# Patient Record
Sex: MALE | Race: WHITE | NOT HISPANIC OR LATINO
[De-identification: names, ages, dates, MRNs, and addresses within clinical notes are randomized per-mention and may not be internally consistent; named-entity substitution may affect disease eponyms.]

---

## 2019-08-13 ENCOUNTER — APPOINTMENT (OUTPATIENT)
Dept: ORTHOPEDIC SURGERY | Facility: CLINIC | Age: 59
End: 2019-08-13
Payer: MEDICAID

## 2019-08-13 VITALS
WEIGHT: 155 LBS | HEART RATE: 76 BPM | DIASTOLIC BLOOD PRESSURE: 78 MMHG | BODY MASS INDEX: 24.91 KG/M2 | SYSTOLIC BLOOD PRESSURE: 140 MMHG | HEIGHT: 66 IN

## 2019-08-13 DIAGNOSIS — S22.000A WEDGE COMPRESSION FRACTURE OF UNSPECIFIED THORACIC VERTEBRA, INITIAL ENCOUNTER FOR CLOSED FRACTURE: ICD-10-CM

## 2019-08-13 DIAGNOSIS — S32.010A WEDGE COMPRESSION FRACTURE OF FIRST LUMBAR VERTEBRA, INITIAL ENCOUNTER FOR CLOSED FRACTURE: ICD-10-CM

## 2019-08-13 PROCEDURE — 96372 THER/PROPH/DIAG INJ SC/IM: CPT | Mod: 59

## 2019-08-13 PROCEDURE — 99204 OFFICE O/P NEW MOD 45 MIN: CPT | Mod: 25

## 2019-08-14 PROBLEM — S32.010A CLOSED COMPRESSION FRACTURE OF FIRST LUMBAR VERTEBRA, INITIAL ENCOUNTER: Status: ACTIVE | Noted: 2019-08-14

## 2019-08-14 PROBLEM — S22.000A CLOSED COMPRESSION FRACTURE OF THORACIC VERTEBRA, INITIAL ENCOUNTER: Status: ACTIVE | Noted: 2019-08-14

## 2019-08-14 NOTE — PROCEDURE
[de-identified] : ketorolac injection 30 mg , Depo-Medrol 40 was given intramuscularly in the right buttock after alcohol swab x3. 18-gauge needle was used to draw out the medication, new needle was changed a 22-gauge afterward. Patient tolerated well without any adverse effects.

## 2019-08-14 NOTE — DISCUSSION/SUMMARY
[de-identified] : Due to noticeable left thigh atrophy, Radiculitis in an L2, L3 distribution probable HNP, need lumbar MRI for treatment plan, injection therapy vs decompression.  mRI is also medically necessary to determine the age of these compression fractures at L1 and T11. I would not recommend bracing as patient does not have any point tenderness over the L1/T11 areas. He did well after a therapeutic injection of ketorolac and Depo-Medrol and will follow with oral anterolateral 5 days and then Medrol Dosepak for 6 days. Physical therapy for decrease pain and increase function. He will followup after MRI is completed for further recommendations including possible injection therapy versus decompression.

## 2019-08-14 NOTE — PHYSICAL EXAM
[de-identified] : Lumbar exam:\par CONSTITUTIONAL: The patient is a very pleasant individual who is well-nourished and who appears stated age.\par PSYCHIATRIC: The patient is alert and oriented X 3 and in no apparent distress, and participates with orthopedic evaluation well.\par HEAD: Atraumatic and is nonsyndromic in appearance.\par EENT: No visible thyromegaly, EOMI.\par RESPIRATORY: Respiratory rate is regular, not dyspneic on examination.\par LYMPHATICS: There is no inguinal lymphadenopathy\par INTEGUMENTARY: Skin is clean, dry, and intact about the bilateral lower extremities and lumbar spine.\par VASCULAR: There is brisk capillary refill about the bilateral lower extremities.\par NEUROLOGIC: There are no pathologic reflexes. There is a free distribution decrease in sensation of the left lower extremity on Wartenberg pinwheel examination. Deep tendon reflexes are well maintained at 2+/4 of the bilateral lower extremities and are symmetric..\par MUSCULOSKELETAL: There is  visible muscular atrophy of the left thigh/quadricep. Manual motor strength is well maintained in the right lower extremity, L2/L3 distribution 4/5 on the left. Range of motion of lumbar spine is well maintained with some discomfort on flextion greater than 30 degrees across lumbar spine, there is piriformis sprain and strain/gluteus tendinopathy type findings right. The patient ambulates in a non-myelopathic manner. Negative tension sign and straight leg raise bilaterally.  ankle dorsiflexion, EHL, plantar flexion, and ankle eversion are well preserved. Normal secondary orthopaedic exam of bilateral hips, greater trochanteric area, knees and ankles\par  [de-identified] : Lumbar x-ray done at [facility] radiology on August 20 19th shows age indeterminate minor superior and plate compression fractures of L1 and T11.  Grade 1 spondylolisthesis at L4-L5 as well as L3-L4 and L5-S1. Facet arthropathy is significant at L5-S1 bilaterally.

## 2019-08-14 NOTE — HISTORY OF PRESENT ILLNESS
[de-identified] : Right-sided low back pain, across back, associated left-sided numbness and weakness in his buttock to his anterior thigh has been going on approximately 6 weeks after swimming in a pole. He felt a popping sensation in his upper back and after that had these symptoms. Pain is worsened with standing and better with lying down. 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 He takes Tylenol and ibuprofen for pain which sometimes is helpful.. He consulted his primary care provider who gave him anti-inflammatories muscle relaxants. Primary care provider did order a lumbar x-ray which did show age indeterminate compression fracture of L1 and T11.

## 2019-08-16 ENCOUNTER — APPOINTMENT (OUTPATIENT)
Dept: MRI IMAGING | Facility: CLINIC | Age: 59
End: 2019-08-16
Payer: MEDICAID

## 2019-08-16 ENCOUNTER — OUTPATIENT (OUTPATIENT)
Dept: OUTPATIENT SERVICES | Facility: HOSPITAL | Age: 59
LOS: 1 days | End: 2019-08-16
Payer: MEDICAID

## 2019-08-16 DIAGNOSIS — M47.816 SPONDYLOSIS WITHOUT MYELOPATHY OR RADICULOPATHY, LUMBAR REGION: ICD-10-CM

## 2019-08-16 DIAGNOSIS — M62.50 MUSCLE WASTING AND ATROPHY, NOT ELSEWHERE CLASSIFIED, UNSPECIFIED SITE: ICD-10-CM

## 2019-08-16 PROCEDURE — 72148 MRI LUMBAR SPINE W/O DYE: CPT | Mod: 26

## 2019-08-16 PROCEDURE — 72148 MRI LUMBAR SPINE W/O DYE: CPT

## 2019-08-28 ENCOUNTER — APPOINTMENT (OUTPATIENT)
Dept: ORTHOPEDIC SURGERY | Facility: CLINIC | Age: 59
End: 2019-08-28
Payer: MEDICAID

## 2019-08-28 VITALS
HEART RATE: 59 BPM | SYSTOLIC BLOOD PRESSURE: 152 MMHG | DIASTOLIC BLOOD PRESSURE: 83 MMHG | HEIGHT: 66 IN | BODY MASS INDEX: 24.91 KG/M2 | WEIGHT: 155 LBS

## 2019-08-28 PROCEDURE — 99215 OFFICE O/P EST HI 40 MIN: CPT

## 2019-08-28 NOTE — DISCUSSION/SUMMARY
[Medication Risks Reviewed] : Medication risks reviewed [Surgical risks reviewed] : Surgical risks reviewed [de-identified] : A long discussion was had with the patient regarding surgical plan L3-L4 discectomy on the left, hemilaminotomy. Patient is aware that surgery is elective in nature and is choosing to proceed with surgery. Risks including persistent numbness, weakness of the left lower extremity, infection, recurrent disc herniation, benefits including decrease in pain scale, return of lower extremity motor strength, alternatives were discussed and all questions, comments and concerns were answered to the patient's satisfaction. The statistical probability of improvement was discussed at length as well as post surgical course.  Literature was provided regarding the specific type of surgery as well as a written surgical consent which the patient will need to sign and return to the office prior to surgical date.  We also discussed his history of low back pain, he does a spondylolisthesis and eventually could require a L4-L5 lumbar fusion and laminectomy however at this time he is not suffering neurogenic claudication type symptoms, back pain is controlled, and he has not exhausted conservative management regarding this issue. His main complaint distally weakness of the left lower extremity caused by this extruded disc herniation at L3-L4.\par If patient is a smoker, discontinuation of smoking was advised and must be accomplished 6-8 weeks prior to surgery date.  Patient was advised that help with quitting smoking is available through [facility] Smoker's Quit Line and phone number/website was provided, or patient can ask assistance from primary care provider.  Elective surgery will not be performed unless patient complies with this policy. \par \par He would like to try one or 2 pain management injections prior to embarking upon spine surgery which I think is somewhat reasonable although due to the size of the disc as well as motor weakness on the left lower extremity I am encouraging him to consider surgery. He was given literature to peruse and was encouraged to the call with any questions comments or concerns. I will call him next week after one pain management injection, and if pain and weakness persists consider scheduling discectomy and hemilaminotomy surgery as above.  He is aware that leaving disc herniation at this without decompression after 12 weeks duration may result in permanent numbness, weakness of the left lower extremity.\par \par best contact number [phone]

## 2019-08-28 NOTE — HISTORY OF PRESENT ILLNESS
[de-identified] : Right-sided low back pain, across back, associated left-sided numbness and weakness in his buttock to his anterior thigh has been going on approximately 8 weeks after swimming in a pole. He felt a popping sensation in his upper back and after that had these symptoms. Pain is worsened with standing and better with lying down. 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 He takes Tylenol and ibuprofen for pain which sometimes is helpful.. He consulted his primary care provider who gave him anti-inflammatories muscle relaxants. Primary care provider did order a lumbar x-ray which did show age indeterminate compression fracture of L1 and T11.

## 2019-08-28 NOTE — PHYSICAL EXAM
[de-identified] : Lumbar exam:\par CONSTITUTIONAL: The patient is a very pleasant individual who is well-nourished and who appears stated age.\par PSYCHIATRIC: The patient is alert and oriented X 3 and in no apparent distress, and participates with orthopedic evaluation well.\par HEAD: Atraumatic and is nonsyndromic in appearance.\par EENT: No visible thyromegaly, EOMI.\par RESPIRATORY: Respiratory rate is regular, not dyspneic on examination.\par LYMPHATICS: There is no inguinal lymphadenopathy\par INTEGUMENTARY: Skin is clean, dry, and intact about the bilateral lower extremities and lumbar spine.\par VASCULAR: There is brisk capillary refill about the bilateral lower extremities.\par NEUROLOGIC: There are no pathologic reflexes. There is a free distribution decrease in sensation of the left lower extremity on Wartenberg pinwheel examination. Deep tendon reflexes are well maintained at 2+/4 of the bilateral lower extremities and are symmetric..\par MUSCULOSKELETAL: There is visible muscular atrophy of the left thigh/quadricep. Manual motor strength is well maintained in the right lower extremity, L2/L3 distribution 4/5 on the left. Range of motion of lumbar spine is well maintained with some discomfort on flextion greater than 30 degrees across lumbar spine, there is piriformis sprain and strain/gluteus tendinopathy type findings right. The patient ambulates in a non-myelopathic manner. Negative tension sign and straight leg raise bilaterally. ankle dorsiflexion, EHL, plantar flexion, and ankle eversion are well preserved. Normal secondary orthopaedic exam of bilateral hips, greater trochanteric area, knees and ankles\par  [de-identified] : Lumbar MRI done in August of 2019 [facility] imaging demonstrates L3-L4 disc herniation paracentral to the left causing moderate central canal stenosis. L4-L5 severe facet arthropathy with moderate to severe bilateral foraminal narrowing and moderate central canal stenosis.STIR sequence does not reveal any acute compression fracture

## 2020-06-11 ENCOUNTER — APPOINTMENT (OUTPATIENT)
Dept: ORTHOPEDIC SURGERY | Facility: CLINIC | Age: 60
End: 2020-06-11
Payer: MEDICAID

## 2020-06-11 VITALS
DIASTOLIC BLOOD PRESSURE: 75 MMHG | BODY MASS INDEX: 25.07 KG/M2 | WEIGHT: 156 LBS | SYSTOLIC BLOOD PRESSURE: 118 MMHG | HEIGHT: 66 IN | HEART RATE: 67 BPM

## 2020-06-11 VITALS — TEMPERATURE: 90.3 F

## 2020-06-11 DIAGNOSIS — M47.816 SPONDYLOSIS W/OUT MYELOPATHY OR RADICULOPATHY, LUMBAR REGION: ICD-10-CM

## 2020-06-11 PROCEDURE — 72100 X-RAY EXAM L-S SPINE 2/3 VWS: CPT

## 2020-06-11 PROCEDURE — 96372 THER/PROPH/DIAG INJ SC/IM: CPT | Mod: 59

## 2020-06-11 PROCEDURE — 99214 OFFICE O/P EST MOD 30 MIN: CPT | Mod: 25

## 2020-06-11 RX ORDER — METHYLPREDNISOLONE 4 MG/1
4 TABLET ORAL
Qty: 1 | Refills: 0 | Status: ACTIVE | COMMUNITY
Start: 2020-06-11 | End: 1900-01-01

## 2020-06-11 RX ORDER — KETOROLAC TROMETHAMINE 10 MG/1
10 TABLET, FILM COATED ORAL EVERY 6 HOURS
Qty: 20 | Refills: 0 | Status: ACTIVE | COMMUNITY
Start: 2020-06-11 | End: 1900-01-01

## 2020-06-11 NOTE — PROCEDURE
[de-identified] : Verbal consent was obtained and all questions, comments and concerns were addressed. Right buttock was cleansed with alcohol prep X 3, ethyl chloride was used as local anesthetic. Under sterile precautions 30mg of Ketorolac and 80mg Depo-Medrol were instilled in to the right buttock under sterile precautions. he tolerated procedure well. Dry sterile dressing was placed and patient described relief of pain 5 minutes after procedure was performed.

## 2020-06-11 NOTE — DISCUSSION/SUMMARY
[de-identified] : Pt tolerated the injection given in office today. I will prescribe a Medrol Dose Pack to provide pain relief to be followed by oral Toradol. An MRI has been ordered and is medically necessary due to persistent pain, chronic disc herniation, and failed PT since March. MRI will help guide treatment plan, possible surgical intervention vs injection therapy with pain management. The patient will follow up after the MRI results have been obtained / 4 weeks.

## 2020-06-11 NOTE — PHYSICAL EXAM
[Poor Appearance] : well-appearing [Obese] : not obese [Acute Distress] : not in acute distress [de-identified] : CONSTITUTIONAL: The patient is a very pleasant individual who is well-nourished and who appears stated age.\par PSYCHIATRIC: The patient is alert and oriented X 3 and in no apparent distress, and participates with orthopedic evaluation well.\par HEAD: Atraumatic and is nonsyndromic in appearance.\par EENT: No visible thyromegaly, EOMI.\par RESPIRATORY: Respiratory rate is regular, not dyspneic on examination.\par LYMPHATICS: There is no inguinal lymphadenopathy\par INTEGUMENTARY: Skin is clean, dry, and intact about the bilateral lower extremities and lumbar spine.\par VASCULAR: There is brisk capillary refill about the bilateral lower extremities.\par NEUROLOGIC: There are no pathologic reflexes. Deep tendon reflexes are well maintained at 2+/4 of the bilateral lower extremities and are symmetric.\par MUSCULOSKELETAL: There is no visible muscular atrophy. Manual motor strength is well maintained in the bilateral lower extremities. Range of motion of lumbar spine is well maintained. The patient ambulates in a non-myelopathic manner. Positive tension sign on the right. Quad extension, ankle dorsiflexion, EHL, plantar flexion, and ankle eversion are well preserved. Normal secondary orthopaedic exam of bilateral hips, greater trochanteric area, knees and ankles \par \par Mechanically orientated low back pain with extension of lumbar spine. chronic unchanged L3 paraesthesia.  [de-identified] : X-ray of the lumbar spine taken today shows advanced lumbar DDD from L3-L5. Retrolisthesis at L3 in relation to L4. Grade 1 spondylolisthesis L4-L5. Advanced end stage spondylosis L5-S1. Pt also has severe DDD in thoracolumbar spine a well what appears to be kyphosis.

## 2020-06-11 NOTE — ADDENDUM
[FreeTextEntry1] : Documented by Guevara Calderon acting as a scribe for ANAIS Navarro on 06/11/2020\par All medical record entries made by the Scribe were at my, ANAIS Navarro, direction and personally dictated by me on 06/11/2020 . I have reviewed the chart and agree that the record accurately reflects my personal performance of the history, physical exam, assessment and plan. I have also personally directed, reviewed, and agreed with the chart.

## 2020-06-11 NOTE — HISTORY OF PRESENT ILLNESS
[Ataxia] : no ataxia [de-identified] : 59 year old M presents with lumbar spine pain. He has had back pain on and off for years, but last 3 months he has had this consistent acute ride sided low back pain. He has been enrolled in PT, ordered by PCP since March 2020 with no relief. He also has persistent numbness of left anterior thigh which is related to chronic disc herniation. Worse with rest, better with activity. [Incontinence] : no incontinence [Loss of Dexterity] : good dexterity [Urinary Ret.] : no urinary retention

## 2020-07-08 ENCOUNTER — OUTPATIENT (OUTPATIENT)
Dept: OUTPATIENT SERVICES | Facility: HOSPITAL | Age: 60
LOS: 1 days | End: 2020-07-08
Payer: MEDICAID

## 2020-07-08 ENCOUNTER — APPOINTMENT (OUTPATIENT)
Dept: MRI IMAGING | Facility: CLINIC | Age: 60
End: 2020-07-08
Payer: MEDICAID

## 2020-07-08 DIAGNOSIS — M43.16 SPONDYLOLISTHESIS, LUMBAR REGION: ICD-10-CM

## 2020-07-08 DIAGNOSIS — M47.816 SPONDYLOSIS WITHOUT MYELOPATHY OR RADICULOPATHY, LUMBAR REGION: ICD-10-CM

## 2020-07-08 DIAGNOSIS — S32.010A WEDGE COMPRESSION FRACTURE OF FIRST LUMBAR VERTEBRA, INITIAL ENCOUNTER FOR CLOSED FRACTURE: ICD-10-CM

## 2020-07-08 DIAGNOSIS — Z00.8 ENCOUNTER FOR OTHER GENERAL EXAMINATION: ICD-10-CM

## 2020-07-08 DIAGNOSIS — M54.16 RADICULOPATHY, LUMBAR REGION: ICD-10-CM

## 2020-07-08 PROCEDURE — 72148 MRI LUMBAR SPINE W/O DYE: CPT

## 2020-07-08 PROCEDURE — 72148 MRI LUMBAR SPINE W/O DYE: CPT | Mod: 26

## 2020-07-24 ENCOUNTER — APPOINTMENT (OUTPATIENT)
Dept: ORTHOPEDIC SURGERY | Facility: CLINIC | Age: 60
End: 2020-07-24
Payer: MEDICAID

## 2020-07-24 VITALS
DIASTOLIC BLOOD PRESSURE: 73 MMHG | HEIGHT: 66 IN | BODY MASS INDEX: 25.07 KG/M2 | HEART RATE: 65 BPM | SYSTOLIC BLOOD PRESSURE: 130 MMHG | WEIGHT: 156 LBS

## 2020-07-24 VITALS — TEMPERATURE: 96.1 F

## 2020-07-24 PROCEDURE — 99214 OFFICE O/P EST MOD 30 MIN: CPT

## 2020-07-24 NOTE — PHYSICAL EXAM
[de-identified] : CONSTITUTIONAL: The patient is a very pleasant individual who is well-nourished and who appears stated age.\par PSYCHIATRIC: The patient is alert and oriented X 3 and in no apparent distress, and participates with orthopedic evaluation well.\par HEAD: Atraumatic and is nonsyndromic in appearance.\par EENT: No visible thyromegaly, EOMI.\par RESPIRATORY: Respiratory rate is regular, not dyspneic on examination.\par LYMPHATICS: There is no inguinal lymphadenopathy\par INTEGUMENTARY: Skin is clean, dry, and intact about the bilateral lower extremities and lumbar spine.\par VASCULAR: There is brisk capillary refill about the bilateral lower extremities.\par NEUROLOGIC: There are no pathologic reflexes. Deep tendon reflexes are well maintained at 2+/4 of the bilateral lower extremities and are symmetric.\par MUSCULOSKELETAL: There is no visible muscular atrophy. Manual motor strength is well maintained in the bilateral lower extremities. Range of motion of lumbar spine is well maintained. The patient ambulates in a non-myelopathic manner. Positive tension sign on the right. Quad extension, ankle dorsiflexion, EHL, plantar flexion, and ankle eversion are well preserved. Normal secondary orthopaedic exam of bilateral hips, greater trochanteric area, knees and ankles \par \par Mechanically orientated low back pain with extension of lumbar spine. chronic unchanged L3 paraesthesia. \par  [de-identified] : Lumbar MRI done at [facility] July 10, 2020 demonstrates continued disc herniation and ligamentum flavum thickening at L3 causing moderate to severe spinal canal stenosis, there is spondylolisthesis L4-L5 also with spinal canal stenosis at the L4 level

## 2020-07-24 NOTE — HISTORY OF PRESENT ILLNESS
[de-identified] : 59 year old M presents with lumbar spine pain. He has had back pain on and off for years, but last 3 months he has had this consistent acute ride sided low back pain. He has been enrolled in PT, ordered by PCP since March 2020 with no relief. He also has persistent numbness of left anterior thigh which is related to chronic disc herniation. Worse with rest.  He is not bothered by pain with walking and standing unless he's been doing so for over 30 minutes or over 1 mile. She can ride his bike and limited. He can also lift heavy objects unlimited. His main issue is back pain

## 2020-07-24 NOTE — DISCUSSION/SUMMARY
[de-identified] : At this time he is not interested in an invasive surgical procedure. He has however interested in decreasing his back pain without taking medication on a regular basis. I refer him to pain management for facet injections at L4-L5 bilaterally and possible radiofrequency ablation. He is aware that surgery is minimally invasive and that he can possibly get relief or anywhere from 6-18 months with this procedure. He'll continue activity as tolerated. He is understanding of the evolving nature of spinal canal stenosis, spondylosis. He will followup in 4- 6 months or p.r.n.

## 2021-07-23 ENCOUNTER — APPOINTMENT (OUTPATIENT)
Dept: ORTHOPEDIC SURGERY | Facility: CLINIC | Age: 61
End: 2021-07-23
Payer: MEDICAID

## 2021-07-23 VITALS
TEMPERATURE: 98.1 F | BODY MASS INDEX: 24.91 KG/M2 | HEART RATE: 72 BPM | HEIGHT: 66 IN | SYSTOLIC BLOOD PRESSURE: 118 MMHG | DIASTOLIC BLOOD PRESSURE: 76 MMHG | WEIGHT: 155 LBS

## 2021-07-23 DIAGNOSIS — M54.5 LOW BACK PAIN: ICD-10-CM

## 2021-07-23 PROCEDURE — 72040 X-RAY EXAM NECK SPINE 2-3 VW: CPT | Mod: 26

## 2021-07-23 PROCEDURE — 72100 X-RAY EXAM L-S SPINE 2/3 VWS: CPT | Mod: 26

## 2021-07-23 PROCEDURE — 99214 OFFICE O/P EST MOD 30 MIN: CPT

## 2021-07-23 NOTE — PHYSICAL EXAM
[Poor Appearance] : well-appearing [Acute Distress] : not in acute distress [Obese] : not obese [de-identified] : CONSTITUTIONAL: Patient is a very pleasant individual who is well-nourished and appears stated age. \par PSYCHIATRIC: Alert and oriented times three and in no apparent distress, and participates with orthopedic evaluation well.\par HEAD: Atraumatic and nonsyndromic in appearance.\par EENT: No thyromegaly, EOMI.\par RESPIRATORY: Respiratory rate is regular, not dyspneic on examination.\par LYMPHATICS: There is no cervical or axillary lymphadenopathy.\par INTEGUMENTARY: Skin is clean, dry, and intact about the bilateral upper extremities and cervical spine. \par VASCULAR: There is brisk capillary refill about the bilateral upper extremities and radial pulses are 2/4. \par NEUROLOGIC: Negative L'hirmitte, negative Spurling’s sign. There are no pathologic reflexes. There is no decrease in sensation of the bilateral upper extremities on Wartenberg pinwheel examination. Deep tendon reflexes are well-maintained at +2/4 of the bilateral upper extremities and are symmetric.\par MUSCULOSKELETAL: The patient ambulates in a non-myelopathic manner. Mild mechanical oriented cervical spine pain, right thenar eminence atrophy, arthritic changes of the MCPs of bilateral hands, + pain in an ulnar nerve distribution of the right elbow. Mechanical lower back pain worse with extension, pain and weakness of the bilateral LE with standing more than one minute. Muscular atrophy of the LLE.  [de-identified] : Lumbar MRI done at [facility] July 10, 2020 demonstrates continued disc herniation and ligamentum flavum thickening at L3 causing moderate to severe spinal canal stenosis, there is spondylolisthesis L4-L5 also with spinal canal stenosis at the L4 level\par \par Xray of the lumbar spine taken 07/23/2021 demonstrates L3-L4 and L4-L5 spondylolisthesis as well as spondylosis at those levels. degenerative disc disease at L5-S1. \par \par Xray of a cervical spine taken 07/23/2021 demonstrates decrease cervical lordosis, spondylosis of C3-C4 through C5-C6, anterior osteophytes at these levels.

## 2021-07-23 NOTE — HISTORY OF PRESENT ILLNESS
[6] : an average pain level of 6/10 [8] : a maximum pain level of 8/10 [de-identified] : 60 year old M Presents for follow up evaluation of lower back pain with an acute complaint of equal bilateral LE pain and weakness worsening for the last two years. At this time he is unable to go shopping. He must flex forward after walking for more than 1 minute. He has a history of LLE weakness and herniated disc. He has been under the Care of Dr. Omalley for the last two years, he just underwent a facet injection with intent for a radio frequency ablation but was informed his insurance does not cover the ablation. He has neck and RUE thenar eminence wasting, right hand weakness leading to dropping objects, and mildly decrease dexterity. He has a Hx of right elbow injury.  [Ataxia] : no ataxia [Incontinence] : no incontinence [Loss of Dexterity] : good dexterity [Urinary Ret.] : no urinary retention

## 2021-07-23 NOTE — DISCUSSION/SUMMARY
[Surgical risks reviewed] : Surgical risks reviewed [de-identified] : We talked about the nature of the condition and treatment options. Anticipatory guidance regarding mechanically oriented lower back pain and mechanical oriented cervical spine pain was given. An MRI has been ordered and is medically necessary due to persistent pain, failure of conservative measures such as pain management under the care of Dr. Omalley for 2 years and currently, and to further evaluate lumbar stenosis as to assess which levels require decompression. MRI will help guide treatment plan, possible surgical intervention vs injection therapy with pain management. The patient will follow up after the MRI results have been obtained. A CT lumbar spine scan has been ordered and is medically necessary due to persistent pain, failure of conservative measures such as 2 years of pain management an actively with Dr. Omalley, to assess the size of screws required for surgical intervention, and to assess OPLL which would change the approach of the surgery. CT scan will help guide treatment plan, possible surgical intervention vs injection therapy with pain management. The patient will follow up after the CT scan results have been obtained. cervical MRI ordered to evaluate for cervical stenosis, right upper extremity radiculitis and possible myelopathy, pain management treatment x 2 years with continued pain and advancement of weakness. \par  Long discussion a pad regarding lumbar surgery, lumbar laminectomy L3, L4 partial laminectomy at L2, L5,posterior instrumented non-instrumented and interbody fusion L3-L4, L4-L5 possible posterior instrumented fusion at L5-S1. We discussed preop, intraoperative and postoperative course in detail. Lumbar laminectomy would be done to decrease/resolved neurogenic claudication type symptoms greater than 90% reproducible results, and fusion would be performed to decrease back pain, 50-70% effective in decreasing pain 2-3 points on the pain scale.  possibilities for need for revision surgery due to adjacent segment disease was discussed at length. risks, benefits,alternatives were discussed. Patient hasexhausted conservative treatment including physical therapy and home exercise for several years as well as current pain management treatment in the form of epidural injections facet injections last injection being 2 weeks ago without much improvement of symptoms.  Patient is a non smoker.  \par \par Prior to appointment and during encounter with patient extensive medical records were reviewed including but not limited to, hospital records, out patient records, imaging results, and lab data. During this appointment the patient was examined, diagnoses were discussed and explained in a face to face manner. In addition extensive time was spent reviewing aforementioned diagnostic studies. Counseling including abnormal image results, differential diagnoses, treatment options, risk and benefits, lifestyle changes, current condition, and current medications was performed. Patient's comments, questions, and concerns were address and patient verbalized understanding. Based on this patient's presentation at our office, which is an orthopedic spine surgeon's office, this patient inherently / intrinsically has a risk, however minute, of developing  issues such as Cauda equina syndrome, bowel and bladder changes, or progression of motor or neurological deficits such as paralysis which may be permanent.

## 2021-07-23 NOTE — ADDENDUM
[FreeTextEntry1] : Documented by Miki Soto acting as a scribe for Candice Singh on 07/23/2021 . All medical record entries made by the Scribe were at my, Candice Singh , direction and personally dictated by me on 07/23/2021  . I have reviewed the chart and agree that the record accurately reflects my personal performance of the history, physical exam, assessment and plan. I have also personally directed, reviewed, and agreed with the chart.

## 2021-08-06 ENCOUNTER — OUTPATIENT (OUTPATIENT)
Dept: OUTPATIENT SERVICES | Facility: HOSPITAL | Age: 61
LOS: 1 days | End: 2021-08-06
Payer: MEDICAID

## 2021-08-06 ENCOUNTER — APPOINTMENT (OUTPATIENT)
Dept: CT IMAGING | Facility: CLINIC | Age: 61
End: 2021-08-06
Payer: MEDICAID

## 2021-08-06 ENCOUNTER — APPOINTMENT (OUTPATIENT)
Dept: MRI IMAGING | Facility: CLINIC | Age: 61
End: 2021-08-06
Payer: MEDICAID

## 2021-08-06 DIAGNOSIS — M48.062 SPINAL STENOSIS, LUMBAR REGION WITH NEUROGENIC CLAUDICATION: ICD-10-CM

## 2021-08-06 DIAGNOSIS — M47.816 SPONDYLOSIS WITHOUT MYELOPATHY OR RADICULOPATHY, LUMBAR REGION: ICD-10-CM

## 2021-08-06 DIAGNOSIS — M47.22 OTHER SPONDYLOSIS WITH RADICULOPATHY, CERVICAL REGION: ICD-10-CM

## 2021-08-06 DIAGNOSIS — M43.16 SPONDYLOLISTHESIS, LUMBAR REGION: ICD-10-CM

## 2021-08-06 PROCEDURE — 72131 CT LUMBAR SPINE W/O DYE: CPT

## 2021-08-06 PROCEDURE — 72148 MRI LUMBAR SPINE W/O DYE: CPT | Mod: 26

## 2021-08-06 PROCEDURE — 72141 MRI NECK SPINE W/O DYE: CPT | Mod: 26

## 2021-08-06 PROCEDURE — 72141 MRI NECK SPINE W/O DYE: CPT

## 2021-08-06 PROCEDURE — 72131 CT LUMBAR SPINE W/O DYE: CPT | Mod: 26

## 2021-08-06 PROCEDURE — 72148 MRI LUMBAR SPINE W/O DYE: CPT

## 2021-09-03 ENCOUNTER — APPOINTMENT (OUTPATIENT)
Dept: ORTHOPEDIC SURGERY | Facility: CLINIC | Age: 61
End: 2021-09-03
Payer: MEDICAID

## 2021-09-03 VITALS
SYSTOLIC BLOOD PRESSURE: 127 MMHG | HEART RATE: 83 BPM | DIASTOLIC BLOOD PRESSURE: 76 MMHG | WEIGHT: 153 LBS | HEIGHT: 66 IN | BODY MASS INDEX: 24.59 KG/M2

## 2021-09-03 DIAGNOSIS — M51.16 INTERVERTEBRAL DISC DISORDERS WITH RADICULOPATHY, LUMBAR REGION: ICD-10-CM

## 2021-09-03 DIAGNOSIS — Z78.9 OTHER SPECIFIED HEALTH STATUS: ICD-10-CM

## 2021-09-03 PROCEDURE — 99215 OFFICE O/P EST HI 40 MIN: CPT

## 2021-09-04 PROBLEM — M51.16 LUMBAR DISC HERNIATION WITH RADICULOPATHY: Status: ACTIVE | Noted: 2019-08-28

## 2021-09-04 NOTE — PHYSICAL EXAM
[Poor Appearance] : well-appearing [Acute Distress] : not in acute distress [Obese] : not obese [de-identified] : CONSTITUTIONAL: Patient is a very pleasant individual who is well-nourished and appears stated age. \par PSYCHIATRIC: Alert and oriented times three and in no apparent distress, and participates with orthopedic evaluation well.\par HEAD: Atraumatic and nonsyndromic in appearance.\par EENT: No thyromegaly, EOMI.\par RESPIRATORY: Respiratory rate is regular, not dyspneic on examination.\par LYMPHATICS: There is no cervical or axillary lymphadenopathy.\par INTEGUMENTARY: Skin is clean, dry, and intact about the bilateral upper extremities and cervical spine. \par VASCULAR: There is brisk capillary refill about the bilateral upper extremities and radial pulses are 2/4. \par NEUROLOGIC: Negative L'hirmitte, negative Spurling’s sign.   Decrease reflexes of the BL UE and LE, clonus on the left.\par MUSCULOSKELETAL: Mild mechanical oriented cervical spine pain, right thenar eminence atrophy, arthritic changes of the MCPs of bilateral hands, + pain in an ulnar nerve distribution of the right elbow. Mechanical lower back pain worse with extension, pain and weakness of the bilateral LE with standing more than one minute. Muscular atrophy of the LLE. Right hand intrinsic weakness.  [de-identified] : Lumbar MRI done at [facility] July 10, 2020 demonstrates continued disc herniation and ligamentum flavum thickening at L3 causing moderate to severe spinal canal stenosis, there is spondylolisthesis L4-L5 also with spinal canal stenosis at the L4 level\par \par Xray of the lumbar spine taken 07/23/2021 demonstrates L3-L4 and L4-L5 spondylolisthesis as well as spondylosis at those levels. degenerative disc disease at L5-S1. \par \par Xray of a cervical spine taken 07/23/2021 demonstrates decrease cervical lordosis, spondylosis of C3-C4 through C5-C6, anterior osteophytes at these levels. \par \par MRI of the cervical spine taken at [facility] on 08- demonstrates myelomalacia at C4-C5 and C5-C6, degenerative disc disease C3-C4, C4-C5, and C5-C6, foraminal stenosis at C3-C4, C4-C5 foraminal stenosis on the right. stenosis on the left at C5-C6. \par \par MRI of the lumbar spine taken at [facility] on 08- demonstrates degenerative disc disease at L3-L4 L4-L5, spondylolisthesis at L4-L5. there is severe spinal canal stenosis at L3, L4, and L5 with significant degenerative disc disease at L5-S1 \par \par CAT scan of the lumbar spine taken at [facility] on 08- demonstrates degenerative disc disease and N2 gas at L3-L4, L4-L5, and L5-S1, spondylolisthesis L4-L5.

## 2021-09-04 NOTE — DISCUSSION/SUMMARY
[de-identified] : We talked about the nature of the condition and treatment options. Anticipatory guidance regarding stenosis, degenerative disc disease, and myelomalacia was given. A CT scan has been ordered and is medically necessary due to persistent pain, failure of conservative measures such as branch block procedures since 07-, to assess the size of screws required for surgical intervention, an exam highlighted by gross neurological deficit, weakness, and atrophy, and to prep this gentlemen for a timely decompressive surgery to arrest further weakening or deficit . Patient's exam was highlighted by weakness, an MRI is necessary as surgical management may be needed to ensure there is no progression of this weakness or long term deficits. An MRI is also necessary as there are sensory deficits which may require surgical intervention, an MRI is needed to begin possible surgical planning and arrest these neurological deficits before they progress or become permanent. CT scan will help guide treatment plan, possible surgical intervention vs injection therapy with pain management. The patient will follow up after the CT scan results have been obtained. \par \par Prior to appointment and during encounter with patient extensive medical records were reviewed including but not limited to, hospital records, out patient records, imaging results, and lab data. During this appointment the patient was examined, diagnoses were discussed and explained in a face to face manner. In addition extensive time was spent reviewing aforementioned diagnostic studies. Counseling including abnormal image results, differential diagnoses, treatment options, risk and benefits, lifestyle changes, current condition, and current medications was performed. Patient's comments, questions, and concerns were address and patient verbalized understanding. Based on this patient's presentation at our office, which is an orthopedic spine surgeon's office, this patient inherently / intrinsically has a risk, however minute, of developing  issues such as Cauda equina syndrome, bowel and bladder changes, or progression of motor or neurological deficits such as paralysis which may be permanent. \par \par Based on imaging demonstrating myelomalacia changes a C3-C4, C4-C5, and C5-C6 ACDF was offered. Anatomic models, Xrays, CT scans/MRI’s were utilized to provide a firm understanding of their surgical plan. Patient is aware that surgery is elective in nature and he choosing to proceed with surgery. Risks, benefits, alternatives were discussed and all questions, comments and concerns were encouraged and answered to the patient's satisfaction. The statistical probability of improvement was discussed at length as well as post surgical course. Literature from [employer] spine society was provided to the patient regarding the specific type of surgery as well as a 5 page written surgical consent which the patient will need to sign and return to the office prior to surgical date. Consent forms highlight specific complications related to the complex nature of spinal surgery.\par  \par Risks of cervical surgery include: dysphagia/difficulty swallowing, Dysphonia/altered voice, adjacent segment disease (which will require more surgery in the future), vascular compromise and stroke, and persistent pain.\par  \par Benefits of cervical surgery include Improved neurologic function and pain score\par  \par We also discussed with the patient complications of incisions directly related to obesity, diabetes, previous wound complications or post-surgical wound infections, smoking, neuropathy, and chronic anticoagulation. This risk has been specifically discussed and the patient will discuss modifiable risk factors to be optimized prior to surgical management. A multimodality approach of primary care physician, and medicine subspecialists will be utilized to optimize medical risk factors.\par  \par If patient is a smoker, discontinuation of smoking was advised and must be accomplished 6-8 weeks prior to surgery date. Patient was advised that help with quitting smoking is available through [place] [facility] and phone number/website was provided, or patient can ask assistance from primary care provider. Elective surgery will not be performed unless patient complies with this policy.

## 2021-09-04 NOTE — HISTORY OF PRESENT ILLNESS
[de-identified] : 60 year old M Presents for follow up evaluation of lower back pain with an acute complaint of equal bilateral LE pain and weakness worsening for the last two years. At this time he is unable to go shopping. He must flex forward after walking for more than 1 minute. He has a history of LLE weakness and herniated disc. He has been under the Care of Dr. Omalley for the last two years, he just underwent a facet injection with intent for a radio frequency ablation but was informed his insurance does not cover the ablation. He has neck and RUE thenar eminence wasting, right hand weakness leading to dropping objects, and mildly decrease dexterity. He has a Hx of right elbow injury. He has started Gabapentin and is scheduled for the final branch block.  [Incontinence] : no incontinence [Ataxia] : no ataxia [Loss of Dexterity] : good dexterity [Urinary Ret.] : no urinary retention

## 2021-09-04 NOTE — ADDENDUM
[FreeTextEntry1] : Documented by Miki Soto acting as a scribe for Candice Singh on 09/03/2021 . All medical record entries made by the Scribe were at my, Candice Singh , direction and personally dictated by me on 09/03/2021  . I have reviewed the chart and agree that the record accurately reflects my personal performance of the history, physical exam, assessment and plan. I have also personally directed, reviewed, and agreed with the chart.

## 2021-09-13 ENCOUNTER — OUTPATIENT (OUTPATIENT)
Dept: OUTPATIENT SERVICES | Facility: HOSPITAL | Age: 61
LOS: 1 days | End: 2021-09-13
Payer: MEDICAID

## 2021-09-13 ENCOUNTER — APPOINTMENT (OUTPATIENT)
Dept: CT IMAGING | Facility: CLINIC | Age: 61
End: 2021-09-13
Payer: MEDICAID

## 2021-09-13 DIAGNOSIS — M47.22 OTHER SPONDYLOSIS WITH RADICULOPATHY, CERVICAL REGION: ICD-10-CM

## 2021-09-13 PROCEDURE — 72125 CT NECK SPINE W/O DYE: CPT | Mod: 26

## 2021-09-13 PROCEDURE — 72125 CT NECK SPINE W/O DYE: CPT

## 2021-09-15 ENCOUNTER — OUTPATIENT (OUTPATIENT)
Dept: OUTPATIENT SERVICES | Facility: HOSPITAL | Age: 61
LOS: 1 days | End: 2021-09-15
Payer: COMMERCIAL

## 2021-09-15 VITALS
HEART RATE: 65 BPM | DIASTOLIC BLOOD PRESSURE: 92 MMHG | WEIGHT: 163.58 LBS | TEMPERATURE: 98 F | RESPIRATION RATE: 18 BRPM | OXYGEN SATURATION: 97 % | SYSTOLIC BLOOD PRESSURE: 138 MMHG | HEIGHT: 65 IN

## 2021-09-15 DIAGNOSIS — Z01.818 ENCOUNTER FOR OTHER PREPROCEDURAL EXAMINATION: ICD-10-CM

## 2021-09-15 DIAGNOSIS — Z91.89 OTHER SPECIFIED PERSONAL RISK FACTORS, NOT ELSEWHERE CLASSIFIED: ICD-10-CM

## 2021-09-15 DIAGNOSIS — Z98.890 OTHER SPECIFIED POSTPROCEDURAL STATES: Chronic | ICD-10-CM

## 2021-09-15 DIAGNOSIS — M54.12 RADICULOPATHY, CERVICAL REGION: ICD-10-CM

## 2021-09-15 DIAGNOSIS — Z90.49 ACQUIRED ABSENCE OF OTHER SPECIFIED PARTS OF DIGESTIVE TRACT: Chronic | ICD-10-CM

## 2021-09-15 LAB
A1C WITH ESTIMATED AVERAGE GLUCOSE RESULT: 5.6 % — SIGNIFICANT CHANGE UP (ref 4–5.6)
ALBUMIN SERPL ELPH-MCNC: 4.4 G/DL — SIGNIFICANT CHANGE UP (ref 3.3–5.2)
ALP SERPL-CCNC: 92 U/L — SIGNIFICANT CHANGE UP (ref 40–120)
ALT FLD-CCNC: 21 U/L — SIGNIFICANT CHANGE UP
ANION GAP SERPL CALC-SCNC: 11 MMOL/L — SIGNIFICANT CHANGE UP (ref 5–17)
APPEARANCE UR: CLEAR — SIGNIFICANT CHANGE UP
AST SERPL-CCNC: 22 U/L — SIGNIFICANT CHANGE UP
BASOPHILS # BLD AUTO: 0.06 K/UL — SIGNIFICANT CHANGE UP (ref 0–0.2)
BASOPHILS NFR BLD AUTO: 0.6 % — SIGNIFICANT CHANGE UP (ref 0–2)
BILIRUB SERPL-MCNC: 0.4 MG/DL — SIGNIFICANT CHANGE UP (ref 0.4–2)
BILIRUB UR-MCNC: NEGATIVE — SIGNIFICANT CHANGE UP
BLD GP AB SCN SERPL QL: SIGNIFICANT CHANGE UP
BUN SERPL-MCNC: 13.1 MG/DL — SIGNIFICANT CHANGE UP (ref 8–20)
CALCIUM SERPL-MCNC: 9.6 MG/DL — SIGNIFICANT CHANGE UP (ref 8.6–10.2)
CHLORIDE SERPL-SCNC: 100 MMOL/L — SIGNIFICANT CHANGE UP (ref 98–107)
CO2 SERPL-SCNC: 26 MMOL/L — SIGNIFICANT CHANGE UP (ref 22–29)
COLOR SPEC: YELLOW — SIGNIFICANT CHANGE UP
CREAT SERPL-MCNC: 0.97 MG/DL — SIGNIFICANT CHANGE UP (ref 0.5–1.3)
DIFF PNL FLD: NEGATIVE — SIGNIFICANT CHANGE UP
EOSINOPHIL # BLD AUTO: 0.08 K/UL — SIGNIFICANT CHANGE UP (ref 0–0.5)
EOSINOPHIL NFR BLD AUTO: 0.8 % — SIGNIFICANT CHANGE UP (ref 0–6)
ESTIMATED AVERAGE GLUCOSE: 114 MG/DL — SIGNIFICANT CHANGE UP (ref 68–114)
GLUCOSE SERPL-MCNC: 96 MG/DL — SIGNIFICANT CHANGE UP (ref 70–99)
GLUCOSE UR QL: NEGATIVE MG/DL — SIGNIFICANT CHANGE UP
HCT VFR BLD CALC: 50.6 % — HIGH (ref 39–50)
HGB BLD-MCNC: 16.5 G/DL — SIGNIFICANT CHANGE UP (ref 13–17)
IMM GRANULOCYTES NFR BLD AUTO: 0.3 % — SIGNIFICANT CHANGE UP (ref 0–1.5)
KETONES UR-MCNC: NEGATIVE — SIGNIFICANT CHANGE UP
LEUKOCYTE ESTERASE UR-ACNC: NEGATIVE — SIGNIFICANT CHANGE UP
LYMPHOCYTES # BLD AUTO: 2.11 K/UL — SIGNIFICANT CHANGE UP (ref 1–3.3)
LYMPHOCYTES # BLD AUTO: 20.2 % — SIGNIFICANT CHANGE UP (ref 13–44)
MCHC RBC-ENTMCNC: 30.1 PG — SIGNIFICANT CHANGE UP (ref 27–34)
MCHC RBC-ENTMCNC: 32.6 GM/DL — SIGNIFICANT CHANGE UP (ref 32–36)
MCV RBC AUTO: 92.3 FL — SIGNIFICANT CHANGE UP (ref 80–100)
MONOCYTES # BLD AUTO: 0.72 K/UL — SIGNIFICANT CHANGE UP (ref 0–0.9)
MONOCYTES NFR BLD AUTO: 6.9 % — SIGNIFICANT CHANGE UP (ref 2–14)
MRSA PCR RESULT.: SIGNIFICANT CHANGE UP
NEUTROPHILS # BLD AUTO: 7.46 K/UL — HIGH (ref 1.8–7.4)
NEUTROPHILS NFR BLD AUTO: 71.2 % — SIGNIFICANT CHANGE UP (ref 43–77)
NITRITE UR-MCNC: NEGATIVE — SIGNIFICANT CHANGE UP
PH UR: 6.5 — SIGNIFICANT CHANGE UP (ref 5–8)
PLATELET # BLD AUTO: 341 K/UL — SIGNIFICANT CHANGE UP (ref 150–400)
POTASSIUM SERPL-MCNC: 4.3 MMOL/L — SIGNIFICANT CHANGE UP (ref 3.5–5.3)
POTASSIUM SERPL-SCNC: 4.3 MMOL/L — SIGNIFICANT CHANGE UP (ref 3.5–5.3)
PROT SERPL-MCNC: 8 G/DL — SIGNIFICANT CHANGE UP (ref 6.6–8.7)
PROT UR-MCNC: NEGATIVE MG/DL — SIGNIFICANT CHANGE UP
RBC # BLD: 5.48 M/UL — SIGNIFICANT CHANGE UP (ref 4.2–5.8)
RBC # FLD: 11.9 % — SIGNIFICANT CHANGE UP (ref 10.3–14.5)
S AUREUS DNA NOSE QL NAA+PROBE: SIGNIFICANT CHANGE UP
SODIUM SERPL-SCNC: 137 MMOL/L — SIGNIFICANT CHANGE UP (ref 135–145)
SP GR SPEC: 1 — LOW (ref 1.01–1.02)
UROBILINOGEN FLD QL: NEGATIVE MG/DL — SIGNIFICANT CHANGE UP
WBC # BLD: 10.46 K/UL — SIGNIFICANT CHANGE UP (ref 3.8–10.5)
WBC # FLD AUTO: 10.46 K/UL — SIGNIFICANT CHANGE UP (ref 3.8–10.5)

## 2021-09-15 PROCEDURE — 93005 ELECTROCARDIOGRAM TRACING: CPT

## 2021-09-15 PROCEDURE — G0463: CPT

## 2021-09-15 PROCEDURE — 93010 ELECTROCARDIOGRAM REPORT: CPT

## 2021-09-15 RX ORDER — SODIUM CHLORIDE 9 MG/ML
3 INJECTION INTRAMUSCULAR; INTRAVENOUS; SUBCUTANEOUS EVERY 8 HOURS
Refills: 0 | Status: DISCONTINUED | OUTPATIENT
Start: 2021-09-20 | End: 2021-09-21

## 2021-09-15 RX ORDER — CEFAZOLIN SODIUM 1 G
2000 VIAL (EA) INJECTION ONCE
Refills: 0 | Status: COMPLETED | OUTPATIENT
Start: 2021-09-20 | End: 2021-09-20

## 2021-09-15 RX ORDER — INFLUENZA VIRUS VACCINE 15; 15; 15; 15 UG/.5ML; UG/.5ML; UG/.5ML; UG/.5ML
0.5 SUSPENSION INTRAMUSCULAR ONCE
Refills: 0 | Status: DISCONTINUED | OUTPATIENT
Start: 2021-09-15 | End: 2021-09-29

## 2021-09-15 NOTE — H&P PST ADULT - HISTORY OF PRESENT ILLNESS
60 year old M with complaints of  lower back pain with equal bilateral LE pain and weakness worsening for the last two years. Patient states he has one leg larger than the other.. He must flex forward after walking for more than 1 minute. He has a history of LLE weakness and herniated disc. He has been under the Care of Dr. Omalley for the last two years, he just underwent a facet injection Patent stated that he was supposed to have a RF done but insurance will not cover.  He has neck and RUE  eminence wasting,  with right hand weakness leading to dropping objects, and mildly decrease dexterity. . He was started on  Gabapentin for same. Patient also stated that he has suicidal ideations, depression and anxiety Patient refused ER states he wants to stay positive and we discussed options for help.  Social work/Psych consult possible  Medical eval today Dr. Mcghee [phone]  Pre op anterior cervical discectomy and fusion c4-c5, c5-c6 and c6-c7

## 2021-09-15 NOTE — H&P PST ADULT - NEUROLOGICAL DETAILS
alert and oriented x 3/responds to pain/responds to verbal commands/sensation intact/deep reflexes hypoactive/strength decreased

## 2021-09-15 NOTE — H&P PST ADULT - ASSESSMENT
60 year old M with complaints of  lower back pain with equal bilateral LE pain and weakness worsening for the last two years. Patient states he has one leg larger than the other.. He must flex forward after walking for more than 1 minute. He has a history of LLE weakness and herniated disc. He has been under the Care of Dr. Omalley for the last two years, he just underwent a facet injection Patent stated that he was supposed to have a RF done but insurance will not cover.  He has neck and RUE  eminence wasting,  with right hand weakness leading to dropping objects, and mildly decrease dexterity. . He was started on  Gabapentin for same. Patient also stated that he has suicidal ideations, depression and anxiety Patient refused ER states he wants to stay positive and we discussed options for help.  Social work/Psych consult possible  Medical eval today Dr. Mcghee [phone]  Pre op anterior cervical discectomy and fusion c4-c5, c5-c6 and c6-c7  Caprini 4

## 2021-09-15 NOTE — H&P PST ADULT - MUSCULOSKELETAL
no joint swelling/decreased ROM/decreased ROM due to pain/diminished strength details… detailed exam

## 2021-09-15 NOTE — H&P PST ADULT - NSICDXPASTMEDICALHX_GEN_ALL_CORE_FT
PAST MEDICAL HISTORY:  Spinal stenosis      PAST MEDICAL HISTORY:  Imprisonment or other incarceration     Spinal stenosis

## 2021-09-15 NOTE — H&P PST ADULT - ATTENDING COMMENTS
pt presents for 3 level ACDF  re: cervical spondylosis and resulting myelopathy. Pt aware of residual myelopathy and incomplete resolution of s/sx. adjacent level dz and revision surg.

## 2021-09-16 PROBLEM — M48.00 SPINAL STENOSIS, SITE UNSPECIFIED: Chronic | Status: ACTIVE | Noted: 2021-09-15

## 2021-09-17 ENCOUNTER — APPOINTMENT (OUTPATIENT)
Dept: ORTHOPEDIC SURGERY | Facility: CLINIC | Age: 61
End: 2021-09-17
Payer: MEDICAID

## 2021-09-17 ENCOUNTER — APPOINTMENT (OUTPATIENT)
Dept: DISASTER EMERGENCY | Facility: CLINIC | Age: 61
End: 2021-09-17

## 2021-09-17 VITALS
BODY MASS INDEX: 25.83 KG/M2 | WEIGHT: 155 LBS | HEART RATE: 69 BPM | DIASTOLIC BLOOD PRESSURE: 90 MMHG | TEMPERATURE: 98.1 F | SYSTOLIC BLOOD PRESSURE: 136 MMHG | HEIGHT: 65 IN

## 2021-09-17 DIAGNOSIS — M62.50 MUSCLE WASTING AND ATROPHY, NOT ELSEWHERE CLASSIFIED, UNSPECIFIED SITE: ICD-10-CM

## 2021-09-17 DIAGNOSIS — M43.16 SPONDYLOLISTHESIS, LUMBAR REGION: ICD-10-CM

## 2021-09-17 LAB
CULTURE RESULTS: NO GROWTH — SIGNIFICANT CHANGE UP
SPECIMEN SOURCE: SIGNIFICANT CHANGE UP

## 2021-09-17 PROCEDURE — 99215 OFFICE O/P EST HI 40 MIN: CPT

## 2021-09-17 NOTE — PHYSICAL EXAM
[Poor Appearance] : well-appearing [Acute Distress] : not in acute distress [Obese] : not obese [de-identified] : CONSTITUTIONAL: Patient is a very pleasant individual who is well-nourished and appears stated age. \par PSYCHIATRIC: Alert and oriented times three and in no apparent distress, and participates with orthopedic evaluation well.\par HEAD: Atraumatic and nonsyndromic in appearance.\par EENT: No thyromegaly, EOMI.\par RESPIRATORY: Respiratory rate is regular, not dyspneic on examination.\par LYMPHATICS: There is no cervical or axillary lymphadenopathy.\par INTEGUMENTARY: Skin is clean, dry, and intact about the bilateral upper extremities and cervical spine. \par VASCULAR: There is brisk capillary refill about the bilateral upper extremities and radial pulses are 2/4. \par NEUROLOGIC: Negative L'hirmitte, negative Spurling’s sign.   Decrease reflexes of the BL UE and LE, clonus on the left.\par MUSCULOSKELETAL: Mild mechanical oriented cervical spine pain, right thenar eminence atrophy, arthritic changes of the MCPs of bilateral hands, + pain in an ulnar nerve distribution of the right elbow. Mechanical lower back pain worse with extension, pain and weakness of the bilateral LE with standing more than one minute. Muscular atrophy of the LLE. Right hand intrinsic weakness.  [de-identified] : Lumbar MRI done at [facility] July 10, 2020 demonstrates continued disc herniation and ligamentum flavum thickening at L3 causing moderate to severe spinal canal stenosis, there is spondylolisthesis L4-L5 also with spinal canal stenosis at the L4 level\par \par Xray of the lumbar spine taken 07/23/2021 demonstrates L3-L4 and L4-L5 spondylolisthesis as well as spondylosis at those levels. degenerative disc disease at L5-S1. \par \par Xray of a cervical spine taken 07/23/2021 demonstrates decrease cervical lordosis, spondylosis of C3-C4 through C5-C6, anterior osteophytes at these levels. \par \par MRI of the cervical spine taken at [facility] on 08- demonstrates myelomalacia at C4-C5 and C5-C6, degenerative disc disease C3-C4, C4-C5, and C5-C6, foraminal stenosis at C3-C4, C4-C5 foraminal stenosis on the right. stenosis on the left at C5-C6. \par \par MRI of the lumbar spine taken at [facility] on 08- demonstrates degenerative disc disease at L3-L4 L4-L5, spondylolisthesis at L4-L5. there is severe spinal canal stenosis at L3, L4, and L5 with significant degenerative disc disease at L5-S1 \par \par CAT scan of the lumbar spine taken at [facility] on 08- demonstrates degenerative disc disease and N2 gas at L3-L4, L4-L5, and L5-S1, spondylolisthesis L4-L5.

## 2021-09-17 NOTE — ADDENDUM
[FreeTextEntry1] : Documented by Miki Soto acting as a scribe for Candice Singh on 09/17/2021 . All medical record entries made by the Scribe were at my, Candice Singh , direction and personally dictated by me on 09/17/2021  . I have reviewed the chart and agree that the record accurately reflects my personal performance of the history, physical exam, assessment and plan. I have also personally directed, reviewed, and agreed with the chart.

## 2021-09-17 NOTE — DISCUSSION/SUMMARY
[Surgical risks reviewed] : Surgical risks reviewed [de-identified] : We talked about the nature of the condition and treatment options. Anticipatory guidance regarding stenosis, degenerative disc disease, and myelomalacia was given. We discussed depression and suicidal tendencies at length, patient openly stated that he has absolutely no plans to commit suicide, while he does suffer from suicidal thoughts in the morning however he states that he has no plan to commit suicide or harm himself or anyone else He has no weapons at his home. He has discussed the depression with his PCP who is aware and has put him on antidepressant medication, which he states made his symptoms worse.  He is willing to talk to social work prior to leaving the hospital after his surgery upcoming on Monday 9/20/2021. \par Prior to appointment and during encounter with patient extensive medical records were reviewed including but not limited to, hospital records, out patient records, imaging results, and lab data. During this appointment the patient was examined, diagnoses were discussed and explained in a face to face manner. In addition extensive time was spent reviewing aforementioned diagnostic studies. Counseling including abnormal image results, differential diagnoses, treatment options, risk and benefits, lifestyle changes, current condition, and current medications was performed. Patient's comments, questions, and concerns were address and patient verbalized understanding. Based on this patient's presentation at our office, which is an orthopedic spine surgeon's office, this patient inherently / intrinsically has a risk, however minute, of developing  issues such as Cauda equina syndrome, bowel and bladder changes, or progression of motor or neurological deficits such as paralysis which may be permanent. \par \par Based on imaging demonstrating myelomalacia changes a C3-C4, C4-C5, and C5-C6 ACDF was offered. We discussed his post operative recovery including driving ability and bracing. Anatomic models, Xrays, CT scans/MRI’s were utilized to provide a firm understanding of their surgical plan. Patient is aware that surgery is elective in nature and he choosing to proceed with surgery. Risks, benefits, alternatives were discussed and all questions, comments and concerns were encouraged and answered to the patient's satisfaction. The statistical probability of improvement was discussed at length as well as post surgical course. Literature from [employer] spine society was provided to the patient regarding the specific type of surgery as well as a 5 page written surgical consent which the patient will need to sign and return to the office prior to surgical date. Consent forms highlight specific complications related to the complex nature of spinal surgery.\par  \par Risks of cervical surgery include: dysphagia/difficulty swallowing, Dysphonia/altered voice, adjacent segment disease (which will require more surgery in the future), vascular compromise and stroke, and persistent pain.\par  \par Benefits of cervical surgery include Improved neurologic function and pain score\par  \par We also discussed with the patient complications of incisions directly related to obesity, diabetes, previous wound complications or post-surgical wound infections, smoking, neuropathy, and chronic anticoagulation. This risk has been specifically discussed and the patient will discuss modifiable risk factors to be optimized prior to surgical management. A multimodality approach of primary care physician, and medicine subspecialists will be utilized to optimize medical risk factors.\par  \par If patient is a smoker, discontinuation of smoking was advised and must be accomplished 6-8 weeks prior to surgery date. Patient was advised that help with quitting smoking is available through [facility] Quit Line and phone number/website was provided, or patient can ask assistance from primary care provider. Elective surgery will not be performed unless patient complies with this policy.
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
## 2021-09-19 ENCOUNTER — TRANSCRIPTION ENCOUNTER (OUTPATIENT)
Age: 61
End: 2021-09-19

## 2021-09-19 LAB — SARS-COV-2 N GENE NPH QL NAA+PROBE: NOT DETECTED

## 2021-09-20 ENCOUNTER — INPATIENT (INPATIENT)
Facility: HOSPITAL | Age: 61
LOS: 0 days | Discharge: HOME | DRG: 472 | End: 2021-09-21
Attending: ORTHOPAEDIC SURGERY | Admitting: ORTHOPAEDIC SURGERY
Payer: COMMERCIAL

## 2021-09-20 ENCOUNTER — APPOINTMENT (OUTPATIENT)
Dept: ORTHOPEDIC SURGERY | Facility: HOSPITAL | Age: 61
End: 2021-09-20

## 2021-09-20 ENCOUNTER — TRANSCRIPTION ENCOUNTER (OUTPATIENT)
Age: 61
End: 2021-09-20

## 2021-09-20 VITALS
TEMPERATURE: 98 F | HEART RATE: 76 BPM | SYSTOLIC BLOOD PRESSURE: 133 MMHG | DIASTOLIC BLOOD PRESSURE: 77 MMHG | WEIGHT: 163.58 LBS | RESPIRATION RATE: 16 BRPM | OXYGEN SATURATION: 99 % | HEIGHT: 65 IN

## 2021-09-20 DIAGNOSIS — Z98.890 OTHER SPECIFIED POSTPROCEDURAL STATES: Chronic | ICD-10-CM

## 2021-09-20 DIAGNOSIS — Z90.49 ACQUIRED ABSENCE OF OTHER SPECIFIED PARTS OF DIGESTIVE TRACT: Chronic | ICD-10-CM

## 2021-09-20 DIAGNOSIS — G95.9 DISEASE OF SPINAL CORD, UNSPECIFIED: ICD-10-CM

## 2021-09-20 LAB — ABO RH CONFIRMATION: SIGNIFICANT CHANGE UP

## 2021-09-20 PROCEDURE — 22552 ARTHRD ANT NTRBD CERVICAL EA: CPT

## 2021-09-20 PROCEDURE — 22552 ARTHRD ANT NTRBD CERVICAL EA: CPT | Mod: AS

## 2021-09-20 PROCEDURE — 22551 ARTHRD ANT NTRBDY CERVICAL: CPT

## 2021-09-20 PROCEDURE — 22551 ARTHRD ANT NTRBDY CERVICAL: CPT | Mod: AS

## 2021-09-20 PROCEDURE — 22845 INSERT SPINE FIXATION DEVICE: CPT | Mod: 59

## 2021-09-20 PROCEDURE — 22853 INSJ BIOMECHANICAL DEVICE: CPT | Mod: AS

## 2021-09-20 PROCEDURE — 22845 INSERT SPINE FIXATION DEVICE: CPT | Mod: AS,59

## 2021-09-20 PROCEDURE — 22853 INSJ BIOMECHANICAL DEVICE: CPT

## 2021-09-20 RX ORDER — GABAPENTIN 400 MG/1
300 CAPSULE ORAL THREE TIMES A DAY
Refills: 0 | Status: DISCONTINUED | OUTPATIENT
Start: 2021-09-21 | End: 2021-09-21

## 2021-09-20 RX ORDER — SODIUM CHLORIDE 9 MG/ML
1000 INJECTION, SOLUTION INTRAVENOUS
Refills: 0 | Status: DISCONTINUED | OUTPATIENT
Start: 2021-09-20 | End: 2021-09-20

## 2021-09-20 RX ORDER — ONDANSETRON 8 MG/1
4 TABLET, FILM COATED ORAL EVERY 6 HOURS
Refills: 0 | Status: DISCONTINUED | OUTPATIENT
Start: 2021-09-20 | End: 2021-09-21

## 2021-09-20 RX ORDER — ONDANSETRON 8 MG/1
4 TABLET, FILM COATED ORAL ONCE
Refills: 0 | Status: DISCONTINUED | OUTPATIENT
Start: 2021-09-20 | End: 2021-09-20

## 2021-09-20 RX ORDER — METHOCARBAMOL 500 MG/1
750 TABLET, FILM COATED ORAL EVERY 8 HOURS
Refills: 0 | Status: DISCONTINUED | OUTPATIENT
Start: 2021-09-20 | End: 2021-09-21

## 2021-09-20 RX ORDER — ACETAMINOPHEN 500 MG
975 TABLET ORAL EVERY 6 HOURS
Refills: 0 | Status: DISCONTINUED | OUTPATIENT
Start: 2021-09-20 | End: 2021-09-21

## 2021-09-20 RX ORDER — OXYCODONE HYDROCHLORIDE 5 MG/1
10 TABLET ORAL
Refills: 0 | Status: DISCONTINUED | OUTPATIENT
Start: 2021-09-20 | End: 2021-09-21

## 2021-09-20 RX ORDER — MAGNESIUM HYDROXIDE 400 MG/1
30 TABLET, CHEWABLE ORAL EVERY 12 HOURS
Refills: 0 | Status: DISCONTINUED | OUTPATIENT
Start: 2021-09-20 | End: 2021-09-21

## 2021-09-20 RX ORDER — ASPIRIN/CALCIUM CARB/MAGNESIUM 324 MG
81 TABLET ORAL DAILY
Refills: 0 | Status: DISCONTINUED | OUTPATIENT
Start: 2021-09-20 | End: 2021-09-21

## 2021-09-20 RX ORDER — CELECOXIB 200 MG/1
200 CAPSULE ORAL ONCE
Refills: 0 | Status: COMPLETED | OUTPATIENT
Start: 2021-09-20 | End: 2021-09-20

## 2021-09-20 RX ORDER — APREPITANT 80 MG/1
40 CAPSULE ORAL ONCE
Refills: 0 | Status: COMPLETED | OUTPATIENT
Start: 2021-09-20 | End: 2021-09-20

## 2021-09-20 RX ORDER — OXYCODONE HYDROCHLORIDE 5 MG/1
5 TABLET ORAL
Refills: 0 | Status: DISCONTINUED | OUTPATIENT
Start: 2021-09-20 | End: 2021-09-21

## 2021-09-20 RX ORDER — DEXAMETHASONE 0.5 MG/5ML
4 ELIXIR ORAL EVERY 6 HOURS
Refills: 0 | Status: COMPLETED | OUTPATIENT
Start: 2021-09-20 | End: 2021-09-21

## 2021-09-20 RX ORDER — SENNA PLUS 8.6 MG/1
2 TABLET ORAL AT BEDTIME
Refills: 0 | Status: DISCONTINUED | OUTPATIENT
Start: 2021-09-20 | End: 2021-09-21

## 2021-09-20 RX ORDER — BENZOCAINE AND MENTHOL 5; 1 G/100ML; G/100ML
1 LIQUID ORAL
Refills: 0 | Status: DISCONTINUED | OUTPATIENT
Start: 2021-09-20 | End: 2021-09-21

## 2021-09-20 RX ORDER — SODIUM CHLORIDE 9 MG/ML
1000 INJECTION, SOLUTION INTRAVENOUS
Refills: 0 | Status: DISCONTINUED | OUTPATIENT
Start: 2021-09-20 | End: 2021-09-21

## 2021-09-20 RX ORDER — GABAPENTIN 400 MG/1
1 CAPSULE ORAL
Qty: 0 | Refills: 0 | DISCHARGE

## 2021-09-20 RX ORDER — FENTANYL CITRATE 50 UG/ML
50 INJECTION INTRAVENOUS
Refills: 0 | Status: DISCONTINUED | OUTPATIENT
Start: 2021-09-20 | End: 2021-09-20

## 2021-09-20 RX ORDER — CEFAZOLIN SODIUM 1 G
2000 VIAL (EA) INJECTION
Refills: 0 | Status: COMPLETED | OUTPATIENT
Start: 2021-09-20 | End: 2021-09-20

## 2021-09-20 RX ORDER — DIAZEPAM 5 MG
2 TABLET ORAL THREE TIMES A DAY
Refills: 0 | Status: DISCONTINUED | OUTPATIENT
Start: 2021-09-20 | End: 2021-09-21

## 2021-09-20 RX ORDER — ACETAMINOPHEN 500 MG
975 TABLET ORAL ONCE
Refills: 0 | Status: COMPLETED | OUTPATIENT
Start: 2021-09-20 | End: 2021-09-20

## 2021-09-20 RX ORDER — LANOLIN ALCOHOL/MO/W.PET/CERES
3 CREAM (GRAM) TOPICAL AT BEDTIME
Refills: 0 | Status: DISCONTINUED | OUTPATIENT
Start: 2021-09-20 | End: 2021-09-21

## 2021-09-20 RX ADMIN — SENNA PLUS 2 TABLET(S): 8.6 TABLET ORAL at 22:27

## 2021-09-20 RX ADMIN — SODIUM CHLORIDE 80 MILLILITER(S): 9 INJECTION, SOLUTION INTRAVENOUS at 22:31

## 2021-09-20 RX ADMIN — SODIUM CHLORIDE 3 MILLILITER(S): 9 INJECTION INTRAMUSCULAR; INTRAVENOUS; SUBCUTANEOUS at 22:18

## 2021-09-20 RX ADMIN — OXYCODONE HYDROCHLORIDE 10 MILLIGRAM(S): 5 TABLET ORAL at 23:44

## 2021-09-20 RX ADMIN — FENTANYL CITRATE 50 MICROGRAM(S): 50 INJECTION INTRAVENOUS at 19:58

## 2021-09-20 RX ADMIN — FENTANYL CITRATE 50 MICROGRAM(S): 50 INJECTION INTRAVENOUS at 18:12

## 2021-09-20 RX ADMIN — Medication 975 MILLIGRAM(S): at 11:06

## 2021-09-20 RX ADMIN — CELECOXIB 200 MILLIGRAM(S): 200 CAPSULE ORAL at 11:07

## 2021-09-20 RX ADMIN — FENTANYL CITRATE 50 MICROGRAM(S): 50 INJECTION INTRAVENOUS at 19:53

## 2021-09-20 RX ADMIN — OXYCODONE HYDROCHLORIDE 10 MILLIGRAM(S): 5 TABLET ORAL at 22:44

## 2021-09-20 RX ADMIN — FENTANYL CITRATE 50 MICROGRAM(S): 50 INJECTION INTRAVENOUS at 18:44

## 2021-09-20 RX ADMIN — APREPITANT 40 MILLIGRAM(S): 80 CAPSULE ORAL at 11:06

## 2021-09-20 RX ADMIN — Medication 100 MILLIGRAM(S): at 22:27

## 2021-09-20 RX ADMIN — Medication 100 MILLIGRAM(S): at 12:45

## 2021-09-20 NOTE — PHYSICAL THERAPY INITIAL EVALUATION ADULT - PRECAUTIONS/LIMITATIONS, REHAB EVAL
c-collar is not mandatory except when being in motor vechicle 3 wks post op. remove collar when in bed, hygiene and when eating/cardiac precautions/spinal precautions

## 2021-09-20 NOTE — CONSULT NOTE ADULT - CONSULT REQUESTED DATE/TIME
Date of Procedure: 05/22/18


Procedure(s) Performed: 


Procedure: Total colonoscopy.





Preoperative diagnosis: Abdominal pain and history of diverticular disease.





Postoperative diagnosis: Sigmoid diverticulosis with no evidence of acute 

diverticulitis, strictures, polyps or cancer.





Preparation: HalfLytely prep.





Sedation: Was provided by anesthesia.





Brief clinical history: The patient is a 51-year-old female with history of 

diverticulitis diagnosed by CT in 2015 that responded to antibiotic treatment.  

She had another episode in 2016 with resolution of her symptoms with treatment.

  The patient had recurrence of her symptoms in March of this year and her 

symptoms has persisted despite treatment with Flagyl.  A CT of the abdomen last 

month showed sigmoid diverticulosis.  She did not have significant improvement 

with Bentyl and continues to complain of left lower quadrant abdominal cramps 

and bloating and back pain and postprandial urgent BMs.  Prior to March she had 

normal bowel movement once a day .  This evaluation is scheduled to rule out 

complicated diverticular disease or other pathology.





Procedure: With the patient on her left lateral decubitus position and after 

informed consent and adequate sedation, the perianal area was inspected and it 

did not show any fissures or fistulas.  There were no masses felt on digital 

rectal examination.  The Olympus CFQ 160L video colonoscope was then inserted 

in the rectum in the usual fashion and advanced to the cecum.  There were 

multiple diverticular orifices seen scattered in the sigmoid but I saw no 

evidence of acute diverticulitis or strictures.  The mucosa appeared healthy.  

No polyps or tumors were seen or any obvious pathology.  I retroflexed the 

endoscope in the rectum before the endoscope was withdrawn.





The patient tolerated the procedure well.





Plan: The patient was reassured.  Discussed dietary measures.  She will follow-

up in the office next month as planned and we would keep you updated on her 

progress.
20-Sep-2021 18:31

## 2021-09-20 NOTE — BRIEF OPERATIVE NOTE - OPERATION/FINDINGS
severe spondylosis
I assisted all aspects of operative positioning including placing shoulder bolster, taping of shoulders in a slightly dependent position, maintenance of head and an extended position. I obtained fluoroscopic imaging confirming the appropriate visualization of levels.  I participated in operative time out.   I cleansed the operative area with Betadine and RN then prepped with DuraPrep. I participated in draping with blue drapes and ioban and then ensured that drapes were appropriately positioned.I assisted with surgical exposure to the pretracheal prevertebral fascia using toothless pickups and then Cloward retractors during which time I assisted with maintenance of hemostasis with Surgi-Jake, Ray-Kade, persistent suction, intermittent irrigation. I assisted with sequential placement of [place] pins at level and level. Annulotomies were performed at level and level using a Bovie cautery as well as a 15 blade. I assisted with discectomy procedure at C4-C5, C5-C6, C6-C7 by using pituitary, micropituitary and suction. I assisted with osteophytectomy by using irrigation and suction.  I assisted with distraction of the disc space using K2 M/Stacey retractor. After placement of intervertebral grafts with a combination of allograft (DBM/Caroline) and autograft (from bur shavings), I assisted with plate placement over the anterior cervical spine by using Cloward retraction, intermittent irrigation, consistent suction.  I verified that screws were placed, tightened and torqued per Stacey protocol. Copious irrigation was then used, final hemostasis was performed with FloSeal, Ray-Kade, and bipolar cautery. Fluoroscopic imaging confirmed appropriate placement of implants. I confirmed with operating surgeon and neuro monitoring that final neuro monitoring was stable.  10 round ANIKET drain was placed and  Closure was performed platysma with 2-0 vicryl, superficial fascia with 3-0 vicryl and skin with 4-0 MONOCRYL.

## 2021-09-20 NOTE — BRIEF OPERATIVE NOTE - NSICDXBRIEFPREOP_GEN_ALL_CORE_FT
PRE-OP DIAGNOSIS:  Cervical spondylosis with myelopathy 20-Sep-2021 11:23:16  Melvin Buckley  
PRE-OP DIAGNOSIS:  Cervical spondylosis with myelopathy 20-Sep-2021 11:23:16  Melvin Buckley  Depression 20-Sep-2021 11:53:22  Candice Navarro

## 2021-09-20 NOTE — DISCHARGE NOTE PROVIDER - HOSPITAL COURSE
patient was admitted for elective anterior cervical discectomy and fusion. The post operative course was uneventful.  PT/OT Worked with patient for acute rehabilitation process. The pain was adequately controlled and patient is able to go home as she can accomplish ADL with help from family member at this time.  The cervical collar was worn for comfort when out of bed. patient was admitted for elective anterior cervical discectomy and fusion. The post operative course was uneventful.  PT/OT Worked with patient for acute rehabilitation process. The pain was adequately controlled and patient is able to go home as she can accomplish ADL with help from family member at this time.  Psychiatry was consulted and cleared for discharge. The cervical collar was worn for comfort when out of bed. patient was admitted for elective anterior cervical discectomy and fusion. The post operative course was uneventful.  PT/OT Worked with patient for acute rehabilitation process. The pain was adequately controlled and patient is able to go home as she can accomplish ADL with help from family member at this time.  Psychiatry was consulted and cleared for discharge. The cervical collar was worn for comfort when out of bed.   addendum 12/14/21:  ACDF surgery was performed for central cervical spinal canal decompression as well as for foraminal decompression.

## 2021-09-20 NOTE — DISCHARGE NOTE PROVIDER - NSDCFUADDINST_GEN_ALL_CORE_FT
Leave occulsive dressing on wound for one week. You may then remove it and leave open to air. Protect while showering.  Leave steri strips intact, they will fall off on their own or be removed on first post op visit. Wear cervical collar when out of bed for comfort, especially when you are passenger in a car , may take off for meals & showering.  Physical therapy will be prescribed on second office visit.  For now, engage in light activity as tolerated, no lifting greater than 5 lb.  No driving while on pain meds and must wear cervical collar when in a vehicle for 28 days.  Use the Valium for neck spasms as needed.

## 2021-09-20 NOTE — ASU PREOP CHECKLIST - AS BP NONINV METHOD
Date Performed: 07/29/2017       Time Performed: 11:02:31

 

DOCTOR:      Du Wheeler 

 

DRUG LIST:     

CLINICAL HISTORY:      CHEST  PAIN

REASON FOR TEST:     

REASON FOR ENDING:     

OBSERVATION:     

CONCLUSION:      Lexiscan stress test was performed under standard four minute protocol.  Radionuclid
e was injected one minute prior to ending the test. No electrocardiographic abormalities were present
 to suggest ischemia. Nuclear imaging and interpretation are pending.

COMMENTS:
electronic

## 2021-09-20 NOTE — DISCHARGE NOTE PROVIDER - CARE PROVIDER_API CALL
Melvin Buckley (DO)  Orthopaedic Surgery  [address] [place] [address], [address]  [place] [address]  Phone: [phone]  Fax: [phone]  Follow Up Time:

## 2021-09-20 NOTE — BRIEF OPERATIVE NOTE - NSICDXBRIEFPOSTOP_GEN_ALL_CORE_FT
POST-OP DIAGNOSIS:  Cervical spondylosis with myelopathy 20-Sep-2021 11:23:33  Melvin Buckley  
POST-OP DIAGNOSIS:  Cervical spondylosis with myelopathy 20-Sep-2021 11:23:33  Melvin Buckley

## 2021-09-20 NOTE — PROGRESS NOTE ADULT - SUBJECTIVE AND OBJECTIVE BOX
KIA MEYERS  [id number]  60yMale    STATUS POST:  ACDF C4-C5, C5-C6, C6-C7 for cervical stenosis, cervical myelopathy with right upper extremity radiculitis and weakness    Disease of spinal cord    Spinal stenosis    Imprisonment or other incarceration    Cervical spondylosis with myelopathy    Cervical spondylosis with myelopathy    Depression    Cervical radiculopathy    At low risk for venous thromboembolism (VTE)    At risk for violence to self related to depression    Anterior cervical discectomy with fusion    No significant past surgical history    S/P cholecystectomy    History of ankle surgery    Depression    lumbar stenosis with neurogenic claudication    DISEASE OF SPINAL CORD, UNSPEC        SUBJECTIVE: Patient seen and examined doing well  Pain controlled, positive posterior neck pain as expected     OBJECTIVE:   T(C): 36.8 (09-20-21 @ 10:42), Max: 36.8 (09-20-21 @ 10:42)  HR: 76 (09-20-21 @ 10:42) (76 - 76)  BP: 133/77 (09-20-21 @ 10:42) (133/77 - 133/77)  RR: 16 (09-20-21 @ 10:42) (16 - 16)  SpO2: 99% (09-20-21 @ 10:42) (99% - 99%)  Constitutional: Pleasant in no acute distress  Psych:A&Ox3  EENT: no dysphonia, no dyspnea.  mild dysphagia as expected  Abdominal: soft and supple non distended  Lymphatics: no pretibial pitting edema  Spine:          Dressing:  clean/dry/intact, ANIKET patent               Sensation:          Upper extremity grossly intact manually,     distribution paresthesia on the right much improved          Lower extremity grossly intact manually                               Motor:                   Lower and upper extremity grossly intact manually, positive posterior cervicalgia as expected            Vascular:[x] warm well perfused; capillary refill <3 seconds                A/P :60y MaleS/P ACDF as above  POD#0  -    Pain control- multimodal approach. Avoid NSAIDS as they may deter fusion.  Tylenol, oxycodone, gabapentin, methocarbamol, prn valium.    -    DVT ppx: [ x]SCDs, early ambulation,  Pharmacolgic not necessary ASA 81 mg once daily    -    Periop abx:  Ancef [x ]        -    Likely 23 hour admission  -    Monitor Drain Output, can discontinue drain when output equal or less than 10 cc/hr/12 hr.  change dressing when drain pulled   -    Resume home meds as appropriate  -PT/OT WBAT, balance and gait  - for myelopathy decadron 6 mg qid x 1 day, then medrol dosepak  - Dr Pelaez will follow for medical issues  - Dr Durbin was consulted for history of suicidal ideation without plan, history of depression, will see patient tomorrow morning before discharge.  social work, clinical care and chaplaincy consult called  -brace cervical collar with OOB is for comfort and not mandatory, never to be worn in bed, with eating or hygeine but should be worn when in a motor vehicle x 28 days post op.   -medical follow up   - patient should be reassured that it is normal to have dysphagia post operative, encourage soft diet, cutting food in small pieces.  cepacol losenges ordered

## 2021-09-20 NOTE — BRIEF OPERATIVE NOTE - NSICDXBRIEFPROCEDURE_GEN_ALL_CORE_FT
PROCEDURES:  Anterior cervical discectomy with fusion 20-Sep-2021 11:23:01  Melvin Buckley  
PROCEDURES:  Anterior cervical discectomy with fusion 20-Sep-2021 11:23:01  Melvin Buckley

## 2021-09-20 NOTE — DISCHARGE NOTE PROVIDER - NSDCMRMEDTOKEN_GEN_ALL_CORE_FT
gabapentin 300 mg oral tablet: 1 tab(s) orally once a day   acetaminophen 325 mg oral tablet: 3 tab(s) orally every 8 hours, As Needed  Aspirin Enteric Coated 81 mg oral delayed release tablet: 1 tab(s) orally once a day MDD:1  gabapentin 300 mg oral tablet: 1 tab(s) orally once a day  methocarbamol 500 mg oral tablet: 1 tab(s) orally every 8 hours, As needed, Muscle Spasm MDD:3  oxyCODONE 5 mg oral tablet: 1-2 tab(s) orally every 4-6 hours PRN mod-severe pain MDD:6  Senna S 50 mg-8.6 mg oral tablet: 2 tab(s) orally once a day (at bedtime), As Needed -for constipation MDD:2

## 2021-09-20 NOTE — CONSULT NOTE ADULT - SUBJECTIVE AND OBJECTIVE BOX
HPI:  60 year old M with complaints of  lower back pain with equal bilateral LE pain and weakness worsening for the last two years. Patient states he has one leg larger than the other.. He must flex forward after walking for more than 1 minute. He has a history of LLE weakness and herniated disc. He has been under the Care of Dr. Omalley for the last two years, he just underwent a facet injection Patent stated that he was supposed to have a RF done but insurance will not cover.  He has neck and RUE  eminence wasting,  with right hand weakness leading to dropping objects, and mildly decrease dexterity. . He was started on  Gabapentin for same.   Psych consulted Dr Carter will see in am     Post op anterior cervical discectomy and fusion c4-c5, c5-c6 and c6-c7 today.           PAST MEDICAL & SURGICAL HISTORY:  Spinal stenosis    Imprisonment or other incarceration    S/P cholecystectomy    History of ankle surgery      fentaNYL    Injectable 50 MICROGram(s) IV Push every 5 minutes PRN  influenza   Vaccine 0.5 milliLiter(s) IntraMuscular once  lactated ringers. 1000 milliLiter(s) IV Continuous <Continuous>  melatonin 3 milliGRAM(s) Oral at bedtime PRN  ondansetron Injectable 4 milliGRAM(s) IV Push once PRN    MEDICATIONS  (STANDING):  influenza   Vaccine 0.5 milliLiter(s) IntraMuscular once  lactated ringers. 1000 milliLiter(s) (150 mL/Hr) IV Continuous <Continuous>    MEDICATIONS  (PRN):  fentaNYL    Injectable 50 MICROGram(s) IV Push every 5 minutes PRN Severe Pain (7 - 10)  melatonin 3 milliGRAM(s) Oral at bedtime PRN Insomnia  ondansetron Injectable 4 milliGRAM(s) IV Push once PRN Nausea and/or Vomiting      Allergies    No Known Allergies    Intolerances        SOCIAL HISTORY:  NO S/D/IVDU    FAMILY HISTORY:  Mom- HTN        ROS  - Headache  + mild Neck pain  - Chest Pain  - SOB  - Abd pain  - Pelvic Pain  - Leg Pain  - Head Ache      Vital Signs Last 24 Hrs  T(C): 36 (20 Sep 2021 16:25), Max: 36.8 (20 Sep 2021 10:42)  T(F): 96.8 (20 Sep 2021 16:25), Max: 98.3 (20 Sep 2021 10:42)  HR: 90 (20 Sep 2021 18:10) (76 - 90)  BP: 136/75 (20 Sep 2021 18:10) (133/77 - 149/84)  BP(mean): --  RR: 18 (20 Sep 2021 18:10) (16 - 20)  SpO2: 99% (20 Sep 2021 18:10) (98% - 100%)    HEENT: PEARLA  Neck: Supple + Bandage clean   Cardio: S1 S2 No Murmur  Pulm: CTA No Rales or Ronchi  Abd: Soft NT ND BS+  Rectal - refused  Ext: No DCT  Skin: No Rash  Neuro: Awake Pleasant    Nausea- mild Zofran  ?Suicide Ideation - Psych called will see in am

## 2021-09-21 ENCOUNTER — TRANSCRIPTION ENCOUNTER (OUTPATIENT)
Age: 61
End: 2021-09-21

## 2021-09-21 VITALS
SYSTOLIC BLOOD PRESSURE: 119 MMHG | DIASTOLIC BLOOD PRESSURE: 68 MMHG | RESPIRATION RATE: 20 BRPM | HEART RATE: 93 BPM | TEMPERATURE: 98 F | OXYGEN SATURATION: 94 %

## 2021-09-21 LAB
COVID-19 SPIKE DOMAIN AB INTERP: POSITIVE
COVID-19 SPIKE DOMAIN ANTIBODY RESULT: 82.7 U/ML — HIGH
SARS-COV-2 IGG+IGM SERPL QL IA: 82.7 U/ML — HIGH
SARS-COV-2 IGG+IGM SERPL QL IA: POSITIVE

## 2021-09-21 PROCEDURE — C1713: CPT

## 2021-09-21 PROCEDURE — C1889: CPT

## 2021-09-21 PROCEDURE — 97163 PT EVAL HIGH COMPLEX 45 MIN: CPT

## 2021-09-21 PROCEDURE — 97530 THERAPEUTIC ACTIVITIES: CPT

## 2021-09-21 PROCEDURE — 86769 SARS-COV-2 COVID-19 ANTIBODY: CPT

## 2021-09-21 PROCEDURE — 97116 GAIT TRAINING THERAPY: CPT

## 2021-09-21 PROCEDURE — 36415 COLL VENOUS BLD VENIPUNCTURE: CPT

## 2021-09-21 PROCEDURE — 99222 1ST HOSP IP/OBS MODERATE 55: CPT

## 2021-09-21 PROCEDURE — 76000 FLUOROSCOPY <1 HR PHYS/QHP: CPT

## 2021-09-21 RX ORDER — METHOCARBAMOL 500 MG/1
1 TABLET, FILM COATED ORAL
Qty: 15 | Refills: 0
Start: 2021-09-21

## 2021-09-21 RX ORDER — SENNOSIDES/DOCUSATE SODIUM 8.6MG-50MG
2 TABLET ORAL
Qty: 20 | Refills: 0
Start: 2021-09-21

## 2021-09-21 RX ORDER — ASPIRIN/CALCIUM CARB/MAGNESIUM 324 MG
1 TABLET ORAL
Qty: 28 | Refills: 0
Start: 2021-09-21

## 2021-09-21 RX ORDER — OXYCODONE HYDROCHLORIDE 5 MG/1
1 TABLET ORAL
Qty: 30 | Refills: 0
Start: 2021-09-21

## 2021-09-21 RX ORDER — ACETAMINOPHEN 500 MG
3 TABLET ORAL
Qty: 0 | Refills: 0 | DISCHARGE
Start: 2021-09-21

## 2021-09-21 RX ADMIN — Medication 4 MILLIGRAM(S): at 05:02

## 2021-09-21 RX ADMIN — Medication 975 MILLIGRAM(S): at 00:29

## 2021-09-21 RX ADMIN — GABAPENTIN 300 MILLIGRAM(S): 400 CAPSULE ORAL at 05:02

## 2021-09-21 RX ADMIN — OXYCODONE HYDROCHLORIDE 10 MILLIGRAM(S): 5 TABLET ORAL at 06:08

## 2021-09-21 RX ADMIN — SODIUM CHLORIDE 3 MILLILITER(S): 9 INJECTION INTRAMUSCULAR; INTRAVENOUS; SUBCUTANEOUS at 13:18

## 2021-09-21 RX ADMIN — Medication 4 MILLIGRAM(S): at 00:29

## 2021-09-21 RX ADMIN — Medication 975 MILLIGRAM(S): at 12:47

## 2021-09-21 RX ADMIN — Medication 81 MILLIGRAM(S): at 12:46

## 2021-09-21 RX ADMIN — Medication 975 MILLIGRAM(S): at 13:40

## 2021-09-21 RX ADMIN — OXYCODONE HYDROCHLORIDE 10 MILLIGRAM(S): 5 TABLET ORAL at 05:08

## 2021-09-21 RX ADMIN — Medication 975 MILLIGRAM(S): at 05:02

## 2021-09-21 RX ADMIN — GABAPENTIN 300 MILLIGRAM(S): 400 CAPSULE ORAL at 12:46

## 2021-09-21 RX ADMIN — SODIUM CHLORIDE 3 MILLILITER(S): 9 INJECTION INTRAMUSCULAR; INTRAVENOUS; SUBCUTANEOUS at 05:00

## 2021-09-21 RX ADMIN — Medication 4 MILLIGRAM(S): at 12:46

## 2021-09-21 RX ADMIN — Medication 975 MILLIGRAM(S): at 01:29

## 2021-09-21 RX ADMIN — SODIUM CHLORIDE 80 MILLILITER(S): 9 INJECTION, SOLUTION INTRAVENOUS at 10:28

## 2021-09-21 RX ADMIN — Medication 975 MILLIGRAM(S): at 06:02

## 2021-09-21 NOTE — BH CONSULTATION LIAISON ASSESSMENT NOTE - PAST PSYCHOTROPIC MEDICATION
Type unknown. Patient took an antidepressant for 3 weeks years ago but states it made his depression worse and caused very vivid dreams. Has not taken any other psych medications since.

## 2021-09-21 NOTE — BH CONSULTATION LIAISON ASSESSMENT NOTE - RISK ASSESSMENT
RF: Chronic medical condition, accesses to weapons,  social isolation   Protective: Sobriety, identifies reasons for living, support from family, Mandaeism beliefs, beloved pets, sobriety, residential stability, no past history of sucidial attempts, compliant with treatment

## 2021-09-21 NOTE — BH CONSULTATION LIAISON ASSESSMENT NOTE - NSBHCONSULTFOLLOWAFTERCARE_PSY_A_CORE FT
Patient denied all suggestions for medication and or outside resources ot help with depression. Patient states all he needs is God and that he attends [employer] weekly and will talk to God however he feels overwhelmed.

## 2021-09-21 NOTE — DISCHARGE NOTE NURSING/CASE MANAGEMENT/SOCIAL WORK - PATIENT PORTAL LINK FT
You can access the FollowMyHealth Patient Portal offered by [facility] by registering at the following website: [email or web address]. By joining [facility]’s FollowMyHealth portal, you will also be able to view your health information using other applications (apps) compatible with our system.

## 2021-09-21 NOTE — BH CONSULTATION LIAISON ASSESSMENT NOTE - CURRENT MEDICATION
MEDICATIONS  (STANDING):  acetaminophen   Tablet .. 975 milliGRAM(s) Oral every 6 hours  aspirin enteric coated 81 milliGRAM(s) Oral daily  dexAMETHasone  Injectable 4 milliGRAM(s) IV Push every 6 hours  dextrose 5% + sodium chloride 0.45%. 1000 milliLiter(s) (80 mL/Hr) IV Continuous <Continuous>  gabapentin 300 milliGRAM(s) Oral three times a day  influenza   Vaccine 0.5 milliLiter(s) IntraMuscular once  senna 2 Tablet(s) Oral at bedtime  sodium chloride 0.9% lock flush 3 milliLiter(s) IV Push every 8 hours    MEDICATIONS  (PRN):  aluminum hydroxide/magnesium hydroxide/simethicone Suspension 30 milliLiter(s) Oral every 12 hours PRN Indigestion  benzocaine 15 mG/menthol 3.6 mG (Sugar-Free) Lozenge 1 Lozenge Oral every 2 hours PRN Sore Throat  diazepam    Tablet 2 milliGRAM(s) Oral three times a day PRN anxiety or spasm refractory to current meds  magnesium hydroxide Suspension 30 milliLiter(s) Oral every 12 hours PRN Constipation  melatonin 3 milliGRAM(s) Oral at bedtime PRN Insomnia  methocarbamol 750 milliGRAM(s) Oral every 8 hours PRN spasmhold prn excessive sedationhol  ondansetron Injectable 4 milliGRAM(s) IV Push every 6 hours PRN Nausea  oxyCODONE    IR 5 milliGRAM(s) Oral every 3 hours PRN Moderate Pain (4 - 6)  oxyCODONE    IR 10 milliGRAM(s) Oral every 3 hours PRN Severe Pain (7 - 10)

## 2021-09-21 NOTE — BH CONSULTATION LIAISON ASSESSMENT NOTE - LEGAL HISTORY
Patient was sentenced to 40 years in [employer] skilled nursing in 1985 for possession of cocaine but states he only spent 6 years in skilled nursing. Has a few past misdemeanors but did not explain when or what for.

## 2021-09-21 NOTE — BH CONSULTATION LIAISON ASSESSMENT NOTE - NSICDXPASTMEDICALHX_GEN_ALL_CORE_FT
PAST MEDICAL HISTORY:  Anxiety and depression     Depression     Imprisonment or other incarceration     Spinal stenosis

## 2021-09-21 NOTE — PROGRESS NOTE ADULT - SUBJECTIVE AND OBJECTIVE BOX
Patient seen and eval at bedside. Patient has no complaints, denies SOB. difficulty swallowing, CP, dizziness. Patient states the numbness in his hands that he usually wakes up with is not there anymore. + Void.    Vital Signs Last 24 Hrs  T(C): 36.4 (21 Sep 2021 06:22), Max: 36.8 (20 Sep 2021 10:42)  T(F): 97.6 (21 Sep 2021 06:22), Max: 98.3 (20 Sep 2021 10:42)  HR: 69 (21 Sep 2021 06:22) (69 - 91)  BP: 127/75 (21 Sep 2021 06:22) (127/75 - 149/84)  RR: 18 (21 Sep 2021 06:22) (13 - 20)  SpO2: 95% (21 Sep 2021 06:22) (93% - 100%)    PE: NAD, alert awake  Neck: anterior dressings mild serosangenous drainage, ANIKET drain intact output 30 ccs since surgery  Motor exam:      Upper extremity                               Delt        Bic         Tric       WF      WE                                               R         5/5        5/5        5/5       5/5       5/5                                               L          5/5        5/5        5/5       5/5      5/5           Lower extremity                             TA       EHL         GS                                                 R      5/5       5/5         5/5                                               L      5/5       5/5          5/5    SILT C5-T1 B/L, Rad/ 2+ B/L  Calf soft, NT B/L  AIN/PIN 5/5, intrisnics 4-/5 right side, left side 4+/5    A/P: s/p ACDF POD#1  ·	Pain control  ·	DVT propx: ASA  ·	PT/OT - WBAT C collar for comfort  ·	Psyc consulted for suicidal ideation  ·	Monitor drain output  ·	Dispo possible home today pending drain output, PT and med clearance     Patient seen and eval at bedside. Patient has no complaints, denies SOB. difficulty swallowing, CP, dizziness. Patient states the numbness in his hands that he usually wakes up with is not there anymore. + Void.    Vital Signs Last 24 Hrs  T(C): 36.4 (21 Sep 2021 06:22), Max: 36.8 (20 Sep 2021 10:42)  T(F): 97.6 (21 Sep 2021 06:22), Max: 98.3 (20 Sep 2021 10:42)  HR: 69 (21 Sep 2021 06:22) (69 - 91)  BP: 127/75 (21 Sep 2021 06:22) (127/75 - 149/84)  RR: 18 (21 Sep 2021 06:22) (13 - 20)  SpO2: 95% (21 Sep 2021 06:22) (93% - 100%)    PE: NAD, alert awake  Neck: anterior dressings mild serosangenous drainage, ANIKET drain intact output 50 ccs since surgery  Motor exam:      Upper extremity                               Delt        Bic         Tric       WF      WE                                               R         5/5        5/5        5/5       5/5       5/5                                               L          5/5        5/5        5/5       5/5      5/5           Lower extremity                             TA       EHL         GS                                                 R      5/5       5/5         5/5                                               L      5/5       5/5          5/5    SILT C5-T1 B/L, Rad/ 2+ B/L  Calf soft, NT B/L  AIN/PIN 5/5, intrisnics 4-/5 right side, left side 4+/5    A/P: s/p ACDF POD#1  ·	Pain control  ·	DVT propx: ASA  ·	PT/OT - WBAT C collar for comfort  ·	Psyc consulted for suicidal ideation  ·	Monitor drain output  ·	Dispo possible home today pending drain output, PT and med clearance

## 2021-09-21 NOTE — BH CONSULTATION LIAISON ASSESSMENT NOTE - OTHER PAST PSYCHIATRIC HISTORY (INCLUDE DETAILS REGARDING ONSET, COURSE OF ILLNESS, INPATIENT/OUTPATIENT TREATMENT)
Patient states he was diagnosed with board line persona ility disorder about 40 years ago but never received treatment.   Patient states he is diagnosed with depression and anxiety, but does not take any medication or see a therapist/pschiatrist for these symptoms. Admits to trying an anti-depressant in the past (type unknown), but states that it made his depression worse so he stopped it after 3 weeks. Patient states he was diagnosed with borderline personality disorder about 40 years ago but never received treatment.   Patient states he is diagnosed with depression and anxiety, but does not take any medication or see a therapist/psychiatrist for these symptoms. Admits to trying an anti-depressant in the past (type unknown), but states that it made his depression worse so he stopped it after 3 weeks.

## 2021-09-21 NOTE — BH CONSULTATION LIAISON ASSESSMENT NOTE - HPI (INCLUDE ILLNESS QUALITY, SEVERITY, DURATION, TIMING, CONTEXT, MODIFYING FACTORS, ASSOCIATED SIGNS AND SYMPTOMS)
Patient is a 60 year old male, , unemployed, living alone in a camper with a past psychiatric history of depression, anxiety, and SI presenting post-cervical diskectomy and fusion. Patient states he has had passive suicidal ideations for about 1 year when he wakes up in the morning because everything is "same old same old" and he has to get out of bed to continue doing the same routine. However, he states that he has "never even entertained the thought of doing something that but it pops into my head in the morning." Patient states that thought-out the day he never has these thoughts of passive suicidality, and when he has these thoughts he thinks of god which then "changes" his depression. Patient denies current feelings of depression and states that he is looking forward to discharge and is hopeful the surgery will allow him to ambulate better than before. Patient denies lack of appetite and states he sleeps well each night (average of 8-11 hours each day). Patient denies any current feelings of depression, hopelessness, worthlessness, anxiety, AH/VH, active or passive suicidality, homicidally, or manic symptoms. Patient denies any past suicide attempt, plan, or intent.

## 2021-09-21 NOTE — BH CONSULTATION LIAISON ASSESSMENT NOTE - NSBHCHARTREVIEWVS_PSY_A_CORE FT
Vital Signs Last 24 Hrs  T(C): 36.4 (21 Sep 2021 06:22), Max: 36.8 (20 Sep 2021 10:42)  T(F): 97.6 (21 Sep 2021 06:22), Max: 98.3 (20 Sep 2021 10:42)  HR: 69 (21 Sep 2021 06:22) (69 - 91)  BP: 127/75 (21 Sep 2021 06:22) (127/75 - 149/84)  BP(mean): --  RR: 18 (21 Sep 2021 06:22) (13 - 20)  SpO2: 95% (21 Sep 2021 06:22) (93% - 100%)

## 2021-09-21 NOTE — BH CONSULTATION LIAISON ASSESSMENT NOTE - NSSUICPROTFACT_PSY_ALL_CORE
Identifies reasons for living/Supportive social network of family or friends/Cultural, spiritual and/or moral attitudes against suicide/Muslim beliefs/Beloved pets

## 2021-09-21 NOTE — BH CONSULTATION LIAISON ASSESSMENT NOTE - DETAILS
See above.  States he has guns because he lives alone in a camper but that they are locked away in a safe.  Patient states that he wakes up in the morning and has passive suicidal thoughts because he has to wake up and do the "same old same old" but that he "never even entertains the thought of doing something like that." States these thoughts go away throughout the day or when he thinks about God.

## 2021-09-21 NOTE — BH CONSULTATION LIAISON ASSESSMENT NOTE - DESCRIPTION
Patient denies using alcohol, tobacco, or any other substances. Patient lives alone in a camper for 30 months. Has no children and has been  for years.

## 2021-09-21 NOTE — BH CONSULTATION LIAISON ASSESSMENT NOTE - SUMMARY
Patient is a 60 year old male, , unemployed, living alone in a camper with a past psychiatric history of depression, anxiety, and SI presenting post-cervical diskectomy and fusion. Patient states he has had passive suicidal ideations for about 1 year when he wakes up in the morning because everything is "same old same old" and he has to get out of bed to continue doing the same routine. However, he states that he has "never even entertained the thought of doing something that but it pops into my head in the morning." Patient was calm and cooperative on assessment. Spoke circumferentially but states he does not have human contact often and likes to talk. Patient has no psychiatric contraindications to discharge. Patient identifies reasons for living, responsibility to others, future orientation, support from family, and profound Yazdanism beliefs. Patient denies any current feelings of passive suicidality, and history of suicidal attempt, intent, or plan.

## 2021-09-28 ENCOUNTER — APPOINTMENT (OUTPATIENT)
Dept: ORTHOPEDIC SURGERY | Facility: CLINIC | Age: 61
End: 2021-09-28
Payer: MEDICAID

## 2021-09-28 PROCEDURE — 72040 X-RAY EXAM NECK SPINE 2-3 VW: CPT

## 2021-09-28 PROCEDURE — 99024 POSTOP FOLLOW-UP VISIT: CPT

## 2021-10-04 PROBLEM — F41.9 ANXIETY DISORDER, UNSPECIFIED: Chronic | Status: ACTIVE | Noted: 2021-09-21

## 2021-10-04 PROBLEM — F32.9 MAJOR DEPRESSIVE DISORDER, SINGLE EPISODE, UNSPECIFIED: Chronic | Status: ACTIVE | Noted: 2021-09-21

## 2021-10-19 ENCOUNTER — APPOINTMENT (OUTPATIENT)
Dept: ORTHOPEDIC SURGERY | Facility: CLINIC | Age: 61
End: 2021-10-19
Payer: MEDICAID

## 2021-10-19 PROCEDURE — 72100 X-RAY EXAM L-S SPINE 2/3 VWS: CPT

## 2021-10-19 PROCEDURE — 99024 POSTOP FOLLOW-UP VISIT: CPT

## 2021-10-19 RX ORDER — OXYCODONE 5 MG/1
5 TABLET ORAL
Qty: 21 | Refills: 0 | Status: ACTIVE | COMMUNITY
Start: 2021-10-19 | End: 1900-01-01

## 2021-10-19 NOTE — HISTORY OF PRESENT ILLNESS
[Clean/Dry/Intact] : clean, dry and intact [Healed] : healed [Neuro Intact] : an unremarkable neurological exam [Vascular Intact] : ~T peripheral vascular exam normal [Doing Well] : is doing well [Excellent Pain Control] : has excellent pain control [No Sign of Infection] : is showing no signs of infection [Chills] : no chills [Fever] : no fever [Erythema] : not erythematous [Discharge] : absent of discharge [Swelling] : not swollen [Dehiscence] : not dehisced [de-identified] : 60 year old M S/p 3 level ACDF. Doing well, posterior neck pain is controlled.  His right arm is tingling, feels less painful and is improving steadily. His hand strength is improving. Denies fever, chills, night sweats.  [de-identified] : S/p C3-C4, C4-C5, C5-C6 ACDF. DOS: 09- [de-identified] : constitutional- No acute distress, non toxic\par psych- no suicidal ideation or plan,states "this is a great day"\par neurologic- no LE radiculitis.  Positive upper extremity tingling, numbness right greater than left. \par skin- incision dry clean and intact. The incision was healing well with steri strips in place..\par musculoskeletal - motor strength is 5/5 left.  Right hand with atrophy thenar eminence as prior to the surgery.  [de-identified] : Xray of a cervical spine taken 10/19/2021 demonstrates  Well positioned 3 level ACDF construct [de-identified] : S/p 3 level ACDF. Doing well [de-identified] : I advised the patient to reframe from repetitive bending, lifting, or twisting as well as to reframe from lifting more than 10 - 15 pounds. I advised light aerobic conditioning and light weight bearing exercises. We will refill the patients pain medication at this time. The patient will follow up in 2 months for a repeat clinical assessment.

## 2021-10-19 NOTE — ADDENDUM
[FreeTextEntry1] : Documented by Miki Soto acting as a scribe for Estephanie Oliver on 10/19/2021 . All medical record entries made by the Scribe were at my, Estephanie Oliver, direction and personally dictated by me on 10/19/2021  . I have reviewed the chart and agree that the record accurately reflects my personal performance of the history, physical exam, assessment and plan. I have also personally directed, reviewed, and agreed with the chart.

## 2021-10-19 NOTE — HISTORY OF PRESENT ILLNESS
[Clean/Dry/Intact] : clean, dry and intact [Healed] : healed [Neuro Intact] : an unremarkable neurological exam [Vascular Intact] : ~T peripheral vascular exam normal [Doing Well] : is doing well [Excellent Pain Control] : has excellent pain control [No Sign of Infection] : is showing no signs of infection [Chills] : no chills [Fever] : no fever [Erythema] : not erythematous [Discharge] : absent of discharge [Swelling] : not swollen [Dehiscence] : not dehisced [de-identified] : S/p C3-C4, C4-C5, C5-C6 ACDF. DOS: 09- [de-identified] : 60 year old M S/p 3 level ACDF. Doing well, posterior neck pain is controlled.  Mild touble swallowing is resolving with time.  His right arm is tingling, feels less painful.  Denies fever, chills, night sweats. Taking percocet only occasionally.  [de-identified] : constitutional- No acute distress, non toxic\par psych- no suicidal ideation or plan,states "this is a great day"\par neurologic- no LE radiculitis.  Positive upper extremity tingling, numbness right greater than left. \par skin- incision dry clean and intact. The incision was healing well with steri strips in place..\par musculoskeletal - motor strength is 5/5 left.  Right hand with atrophy thenar eminence as prior to the surgery.  [de-identified] : Xray of a cervical spine taken 09/23/2021 demonstrates Well positioned 3 level ACDF construct [de-identified] : S/p 3 level ACDF. Doing well [de-identified] : Continue tylenol 650 mg tid prn mild to moderate pain, methocarbamol 750 mg tid prn spasm/do not drive while taking.  oxycodone 5 mg bid prn severe pain- still has left over since discharge from hospital.  Ice, heat, light stretching.  Will rx physical therapy next visit for upper extremity strengthening and light massage.  incisional care was discussed.  Continue no heavy lifting, no repetitive lifting/ bending.  Avoid NSAIDS 6 weeks post op.  cepacol losenges, viscous liquids, cut food small pieces until mild dysphagia resolves.  follow with PCP regarding history of depression very well controlled at this time.  Discussed will hold off on lumbar decompression as lower extremity strength is better since this surgery.  follow up 3 weeks and prn.

## 2022-02-04 ENCOUNTER — APPOINTMENT (OUTPATIENT)
Dept: UROLOGY | Facility: CLINIC | Age: 62
End: 2022-02-04
Payer: MEDICAID

## 2022-02-04 VITALS
BODY MASS INDEX: 24.91 KG/M2 | DIASTOLIC BLOOD PRESSURE: 85 MMHG | OXYGEN SATURATION: 98 % | SYSTOLIC BLOOD PRESSURE: 129 MMHG | WEIGHT: 155 LBS | HEART RATE: 69 BPM | TEMPERATURE: 96.9 F | HEIGHT: 66 IN

## 2022-02-04 DIAGNOSIS — Z63.5 DISRUPTION OF FAMILY BY SEPARATION AND DIVORCE: ICD-10-CM

## 2022-02-04 DIAGNOSIS — R39.9 UNSPECIFIED SYMPTOMS AND SIGNS INVOLVING THE GENITOURINARY SYSTEM: ICD-10-CM

## 2022-02-04 DIAGNOSIS — R39.15 URGENCY OF URINATION: ICD-10-CM

## 2022-02-04 DIAGNOSIS — R68.89 OTHER GENERAL SYMPTOMS AND SIGNS: ICD-10-CM

## 2022-02-04 PROCEDURE — 99204 OFFICE O/P NEW MOD 45 MIN: CPT

## 2022-02-04 SDOH — SOCIAL STABILITY - SOCIAL INSECURITY: DISRUPTION OF FAMILY BY SEPARATION AND DIVORCE: Z63.5

## 2022-02-10 LAB
PSA FREE FLD-MCNC: 16 %
PSA FREE SERPL-MCNC: 0.34 NG/ML
PSA SERPL-MCNC: 2.07 NG/ML

## 2022-02-11 LAB
APPEARANCE: CLEAR
BILIRUBIN URINE: NEGATIVE
BLOOD URINE: NEGATIVE
COLOR: COLORLESS
GLUCOSE QUALITATIVE U: NEGATIVE
KETONES URINE: NEGATIVE
LEUKOCYTE ESTERASE URINE: NEGATIVE
NITRITE URINE: NEGATIVE
PH URINE: 6.5
PROTEIN URINE: NEGATIVE
SPECIFIC GRAVITY URINE: 1
UROBILINOGEN URINE: NORMAL

## 2022-02-13 LAB — BACTERIA UR CULT: NORMAL

## 2022-02-14 ENCOUNTER — OUTPATIENT (OUTPATIENT)
Dept: OUTPATIENT SERVICES | Facility: HOSPITAL | Age: 62
LOS: 1 days | End: 2022-02-14
Payer: MEDICAID

## 2022-02-14 ENCOUNTER — APPOINTMENT (OUTPATIENT)
Dept: ULTRASOUND IMAGING | Facility: CLINIC | Age: 62
End: 2022-02-14
Payer: MEDICAID

## 2022-02-14 ENCOUNTER — RESULT REVIEW (OUTPATIENT)
Age: 62
End: 2022-02-14

## 2022-02-14 ENCOUNTER — APPOINTMENT (OUTPATIENT)
Dept: MRI IMAGING | Facility: CLINIC | Age: 62
End: 2022-02-14
Payer: MEDICAID

## 2022-02-14 DIAGNOSIS — Z90.49 ACQUIRED ABSENCE OF OTHER SPECIFIED PARTS OF DIGESTIVE TRACT: Chronic | ICD-10-CM

## 2022-02-14 DIAGNOSIS — Z98.890 OTHER SPECIFIED POSTPROCEDURAL STATES: Chronic | ICD-10-CM

## 2022-02-14 DIAGNOSIS — R39.15 URGENCY OF URINATION: ICD-10-CM

## 2022-02-14 PROCEDURE — 72197 MRI PELVIS W/O & W/DYE: CPT | Mod: 26

## 2022-02-14 PROCEDURE — 76770 US EXAM ABDO BACK WALL COMP: CPT | Mod: 26

## 2022-02-14 PROCEDURE — 76377 3D RENDER W/INTRP POSTPROCES: CPT

## 2022-02-14 PROCEDURE — A9585: CPT

## 2022-02-14 PROCEDURE — 76377 3D RENDER W/INTRP POSTPROCES: CPT | Mod: 26

## 2022-02-14 PROCEDURE — 72197 MRI PELVIS W/O & W/DYE: CPT

## 2022-02-14 PROCEDURE — 76770 US EXAM ABDO BACK WALL COMP: CPT

## 2022-02-17 ENCOUNTER — APPOINTMENT (OUTPATIENT)
Dept: UROLOGY | Facility: CLINIC | Age: 62
End: 2022-02-17
Payer: MEDICAID

## 2022-02-17 VITALS — BODY MASS INDEX: 24.91 KG/M2 | WEIGHT: 155 LBS | HEIGHT: 66 IN | TEMPERATURE: 97.8 F

## 2022-02-17 DIAGNOSIS — N32.81 OVERACTIVE BLADDER: ICD-10-CM

## 2022-02-17 PROCEDURE — 99213 OFFICE O/P EST LOW 20 MIN: CPT

## 2022-02-17 RX ORDER — OXYBUTYNIN CHLORIDE 10 MG/1
10 TABLET, EXTENDED RELEASE ORAL
Qty: 30 | Refills: 3 | Status: ACTIVE | COMMUNITY
Start: 2022-02-17 | End: 1900-01-01

## 2022-02-17 NOTE — HISTORY OF PRESENT ILLNESS
[FreeTextEntry1] : 61 male patient with a pertinent past medical history of  who presents to clinic today with complaints of voiding LUTS.  Symptoms have been present for 1 year\par These symptoms are severely bothersome. URGENCY is the most bothersome symptom.  \par Wakes up 1x /night to use bathroom. \par Quality of his stream is okay // continuous. Bowel Movements are regular. Denies history of colonic diverticulum/diverticulitis.  \par Denies chronic opiate/anticholinergic use. \par Denies Hematuria. No  surgical Hx including Uroflow, UDS, Cystoscopy.  \par Denies past UTI Hx, denies fever, chills, sweats, flank pain. \par Denies history of kidney stones or bladder stones.  No Neurologic Hx, \par No Hx of DM , peripheral neuropathy. \par IPSS: 8   ///    STALIN: 2\par Fam Hx: father kidney cancer\par Mother: breast cancer\par \par Feb 17 2022:\par US KB: Urinary bladder: Within normal limits. PVR = 11cc.\par The prostate gland measures 3.9 x 3.9 x 3.9 cm with a volume of 30 cc which is mildly enlarged.\par UCx / UA: neg  // PSA WNL\par mpMRI: negative: no lesions --  Volume: 29 cc.   [Urinary Urgency] : urinary urgency

## 2022-02-17 NOTE — HISTORY OF PRESENT ILLNESS
[Urinary Urgency] : urinary urgency [FreeTextEntry1] : 61 male patient with a pertinent past medical history of  who presents to clinic today with complaints of voiding LUTS . \par Symptoms have been present for 1 year\par These symptoms are severely bothersome. URGENCY is the most bothersome symptom.  \par Wakes up 1x /night to use bathroom. \par Quality of his stream is okay // continuous\par \par Bowel Movements are regular. Denies history of colonic diverticulum/diverticulitis.  \par Denies chronic opiate/anticholinergic use. \par Denies Hematuria. \par No  surgical Hx including Uroflow, UDS, Cystoscopy.  \par Denies past UTI Hx, denies fever, chills, sweats, flank pain. \par Denies history of kidney stones or bladder stones.  No Neurologic Hx, \par No Hx of DM , peripheral neuropathy. \par \par IPSS: 8   ///    STALIN: 2\par Fam Hx: father kidney cancer\par Mother: breast cancer\par \par \par

## 2022-02-17 NOTE — PHYSICAL EXAM
[Urinary Bladder Findings] : the bladder was normal on palpation [Normal Station and Gait] : the gait and station were normal for the patient's age [Skin Color & Pigmentation] : normal skin color and pigmentation [No Focal Deficits] : no focal deficits [FreeTextEntry1] : WHIT: 3x3, right mid/base small nodule very peripheral

## 2022-02-17 NOTE — ASSESSMENT
[FreeTextEntry1] : 62 yo male presents today with LUTS: most bothersome symptom urinary URGENCY.\par WHIT: right mid/base nodule felt\par Fam Hx: father kidney cancer\par Mother: breast cancer \par Feb 17 2022: S Cr =1\par US KB: Urinary bladder: Within normal limits. PVR = 11cc.\par The prostate gland measures 3.9 x 3.9 x 3.9 cm with a volume of 30 cc which is mildly enlarged.\par UCx / UA: neg  // PSA WNL //  mpMRI: negative: no lesions --  Volume: 29 cc.  \par \par \par Plan: Main issue: URGENCY + mild freq but all workup normal. he is pre-diabetic.\par Encourage blood glucose control to minimize frequency\par \par Start Oxybutynin 10 mg ER daily --  Anticholinergics if predominant Sx Urgency and PVR <250-300 (use with caution in these patients)\par -No evidence for efficacy of Saw palmetto or Urtica Doicia in management of LUTS\par \par -Plan for nocturia: Complete a Frequency and Volume Chart\par \par -Lifestyle Modifications for Frequency and Nocturia: \par \par Keep legs elevated 1-2 hours prior to sleep. Urinate prior to bed. \par Limit Caffeine, Spicy foods, alcohol and other bladder irritants. \par Limit narcotics and anticholinergic medications. \par Bowel regimen for regular stools: OTC Colace or Miralax \par Do not drink water for 3 hours prior to bedtime.\par -Lifestyle Modifications for urgency: Timed voiding, Pelvic floor contractions\par \par All complaints were reviewed and explained thoroughly\par All the patient's questions were answered to the best of my ability.\par I appreciate the opportunity in taking care of Mr Aponte and will take excellent care of him/her.\par \par \par \par \par \par \par

## 2022-02-17 NOTE — LETTER BODY
[Dear  ___] : Dear  [unfilled], [Consult Letter:] : I had the pleasure of evaluating your patient, [unfilled]. [Please see my note below.] : Please see my note below. [Consult Closing:] : Thank you very much for allowing me to participate in the care of this patient.  If you have any questions, please do not hesitate to contact me. [Sincerely,] : Sincerely, [FreeTextEntry3] : Jaun Garcia MD\par Urologic Oncology\par [facility]\par [facility] [place]\par [facility]\par

## 2022-02-17 NOTE — REVIEW OF SYSTEMS
[Slow urine stream] : slow urine stream [Urine Infection (bladder/kidney)] : denies bladder/kidney infections [Pain during urination] : denies pain during urination [Pain at onset of urination] : denies pain during onset of urination [Pain after urination] : denies pain after urination [Blood in urine that you can see] : denies seeing blood in urine [Told you have blood in urine on a urine test] : denies being told that blood was present in a urine test [Discharge from urine canal] : denies discharge from urine canal [History of kidney stones] : denies history of kidney stones [Urine retention] : denies urine retention [Wake up at night to urinate  How many times?  ___] : denies waking up at night to urinate [Strong urge to urinate] : denies strong urge to urinate [Bladder pressure] : denies bladder pressure [Strain or push to urinate] : denies straining or pushing to urinate [Wait a long time to urinate] : denies waiting a long time to urinate [Interrupted urine stream] : denies interrupted urine stream [Bladder fullness after urinating] : denies bladder fullness after urinating [Increased pain/discomfort with bladder filling] : denies increased pain/discomfort with bladder filling [Bladder problems as child. If yes, describe..] : denies bladder problems as child [Leakage of urine with straining, coughing, laughing] : denies leakage of urine with straining, coughing, and/or laughing [Unaware of when urine is leaking] : aware of when urine is leaking

## 2022-02-17 NOTE — REVIEW OF SYSTEMS
[Urine Infection (bladder/kidney)] : denies bladder/kidney infections [Pain during urination] : denies pain during urination [Pain at onset of urination] : denies pain during onset of urination [Pain after urination] : denies pain after urination [Blood in urine that you can see] : denies seeing blood in urine [Told you have blood in urine on a urine test] : denies being told that blood was present in a urine test [Discharge from urine canal] : denies discharge from urine canal [History of kidney stones] : denies history of kidney stones [Urine retention] : denies urine retention [Wake up at night to urinate  How many times?  ___] : denies waking up at night to urinate [Strong urge to urinate] : denies strong urge to urinate [Bladder pressure] : denies bladder pressure [Strain or push to urinate] : denies straining or pushing to urinate [Wait a long time to urinate] : denies waiting a long time to urinate [Slow urine stream] : slow urine stream [Interrupted urine stream] : denies interrupted urine stream [Bladder fullness after urinating] : denies bladder fullness after urinating [Increased pain/discomfort with bladder filling] : denies increased pain/discomfort with bladder filling [Bladder problems as child. If yes, describe..] : denies bladder problems as child [Leakage of urine with straining, coughing, laughing] : denies leakage of urine with straining, coughing, and/or laughing [Unaware of when urine is leaking] : aware of when urine is leaking

## 2022-02-17 NOTE — PHYSICAL EXAM
[Urinary Bladder Findings] : the bladder was normal on palpation [FreeTextEntry1] : WHIT: 3x3, right mid/base small nodule very peripheral [Skin Color & Pigmentation] : normal skin color and pigmentation [Normal Station and Gait] : the gait and station were normal for the patient's age [No Focal Deficits] : no focal deficits

## 2022-02-17 NOTE — LETTER BODY
[Dear  ___] : Dear  [unfilled], [Consult Letter:] : I had the pleasure of evaluating your patient, [unfilled]. [Please see my note below.] : Please see my note below. [Consult Closing:] : Thank you very much for allowing me to participate in the care of this patient.  If you have any questions, please do not hesitate to contact me. [Sincerely,] : Sincerely, [FreeTextEntry3] : Jaun Garcia MD\par Urologic Oncology\par [facility]rology\par [facility] at [place]\par [facility]\par

## 2022-02-22 ENCOUNTER — APPOINTMENT (OUTPATIENT)
Dept: ORTHOPEDIC SURGERY | Facility: CLINIC | Age: 62
End: 2022-02-22
Payer: MEDICAID

## 2022-02-22 VITALS
WEIGHT: 155 LBS | HEART RATE: 70 BPM | BODY MASS INDEX: 24.91 KG/M2 | HEIGHT: 66 IN | SYSTOLIC BLOOD PRESSURE: 115 MMHG | DIASTOLIC BLOOD PRESSURE: 75 MMHG

## 2022-02-22 DIAGNOSIS — M47.22 OTHER SPONDYLOSIS WITH RADICULOPATHY, CERVICAL REGION: ICD-10-CM

## 2022-02-22 PROCEDURE — 72040 X-RAY EXAM NECK SPINE 2-3 VW: CPT

## 2022-02-22 PROCEDURE — 72100 X-RAY EXAM L-S SPINE 2/3 VWS: CPT

## 2022-02-22 PROCEDURE — 73502 X-RAY EXAM HIP UNI 2-3 VIEWS: CPT

## 2022-02-22 PROCEDURE — 99215 OFFICE O/P EST HI 40 MIN: CPT

## 2022-02-22 NOTE — PHYSICAL EXAM
[Acute Distress] : not in acute distress [Poor Appearance] : well-appearing [Obese] : not obese [de-identified] : CONSTITUTIONAL: Patient is a very pleasant individual who is well-nourished and appears stated age. \par PSYCHIATRIC: Alert and oriented times three and in no apparent distress, and participates with orthopedic evaluation well.\par HEAD: Atraumatic and nonsyndromic in appearance.\par EENT: No thyromegaly, EOMI.\par RESPIRATORY: Respiratory rate is regular, not dyspneic on examination.\par LYMPHATICS: There is no cervical or axillary lymphadenopathy.\par INTEGUMENTARY: Skin is clean, dry, and intact about the bilateral upper extremities and cervical spine. \par VASCULAR: There is brisk capillary refill about the bilateral upper extremities and radial pulses are 2/4. \par NEUROLOGIC: Negative L'hirmitte, negative Spurling’s sign. There are no pathologic reflexes. Signs and symptoms of neurogenic claudication. Deep tendon reflexes are well-maintained at +2/4 of the bilateral upper extremities and are symmetric. Right lateral little finger numbness. \par MUSCULOSKELETAL:  Expected decrease in cervical ROM. Hand intrinsic weakness, atrophy of the right thenar eminence. The patient ambulates in a non-myelopathic manner. internal derangement characteristics of the right hip, + pain with ER of the right hip. [de-identified] : Xray of a cervical spine taken 10/19/2021 demonstrates Well positioned 3 level ACDF construct. \par \par Xray of a cervical spine taken 02/22/2022 demonstrates 3 level ACDF at C3 to C6, surgical implants appear well positioned.\par \par Xray of the lumbar spine taken 02/22/2022 demonstrates on AP view facet arthropathy and spondylosis at L4-L5 L5-S1, osteophyte formation laterally at L1-L2 and T12 - L1. Decrease intervertebral disc space L3-L4 to L5-S1, S1 appears sacralized. spondylolisthesis at L4-L5 and L3-L4.\par \par AP pelvis taken 02/22/2022 demonstrates right sided hip dysplasia, severe BL degenerative joint disease, and cystic changes of the BL hips. \par \par \par Lumbar MRI done at [facility] July 10, 2020 demonstrates continued disc herniation and ligamentum flavum thickening at L3 causing moderate to severe spinal canal stenosis, there is spondylolisthesis L4-L5 also with spinal canal stenosis at the L4 level\par \par Xray of the lumbar spine taken 07/23/2021 demonstrates L3-L4 and L4-L5 spondylolisthesis as well as spondylosis at those levels. degenerative disc disease at L5-S1. \par \par Xray of a cervical spine taken 07/23/2021 demonstrates decrease cervical lordosis, spondylosis of C3-C4 through C5-C6, anterior osteophytes at these levels. \par \par MRI of the cervical spine taken at [facility] on 08- demonstrates myelomalacia at C4-C5 and C5-C6, degenerative disc disease C3-C4, C4-C5, and C5-C6, foraminal stenosis at C3-C4, C4-C5 foraminal stenosis on the right. stenosis on the left at C5-C6. \par \par MRI of the lumbar spine taken at [facility] on 08- demonstrates degenerative disc disease at L3-L4 L4-L5, spondylolisthesis at L4-L5. there is severe spinal canal stenosis at L3, L4, and L5 with significant degenerative disc disease at L5-S1 as well as modic changes at this level. \par \par CAT scan of the lumbar spine taken at [facility] on 08- demonstrates degenerative disc disease and N2 gas at L3-L4, L4-L5, and L5-S1, spondylolisthesis L4-L5.

## 2022-02-22 NOTE — DISCUSSION/SUMMARY
[Medication Risks Reviewed] : Medication risks reviewed [de-identified] : We talked about the nature of the condition and treatment options. Anticipatory guidance regarding degenerative joint disease and lumbar spondylolisthesis, stenosis was given. Reiterated that cervical surgery was meant to arrest progression of weakness/pain of the upper and lower extremities.  Patient has been referred to physical therapy for decreased pain modalities, stretching and strengthening modalities, soft tissue modalities, and physical modalities. He does not want surgery for his lumbar stenosis at this time and we had long discussion regarding the natural history.  tylenol 1000 mg tid prn pain. Patient referred to hip specialty for evaluation of his degenerative joint disease and dysplasia. The patient will follow up in 2 months for a repeat clinical assessment. \par \par Prior to appointment and during encounter with patient extensive medical records were reviewed including but not limited to, hospital records, out patient records, imaging results, and lab data. During this appointment the patient was examined, diagnoses were discussed and explained in a face to face manner. In addition extensive time was spent reviewing aforementioned diagnostic studies. Counseling including abnormal image results, differential diagnoses, treatment options, risk and benefits, lifestyle changes, current condition, and current medications was performed. Patient's comments, questions, and concerns were address and patient verbalized understanding. Based on this patient's presentation at our office, which is an orthopedic spine surgeon's office, this patient inherently / intrinsically has a risk, however minute, of developing  issues such as Cauda equina syndrome, bowel and bladder changes, or progression of motor or neurological deficits such as paralysis which may be permanent. \par \par Patient with multiple medical comorbidity increasing the risk of perioperative and postoperative complications as well as diminished spine outcomes as per the current medical literature.  These include but are not limited to obesity, anxiety/depression, cardiac illness, kidney disease, peripheral vascular disease, history of cancer, COPD, dysmetabolic syndrome including but not limited to diabetes, hyperlipidemia, hypertension.  Patient is being referred back to primary care provider for medical optimization, as well as other appropriate specialists as needed for optimization prior to spine surgery.

## 2022-02-22 NOTE — ADDENDUM
[FreeTextEntry1] : Documented by Miki Soto acting as a scribe for Candice Singh on 02/22/2022 . All medical record entries made by the Scribe were at my, Candice Singh , direction and personally dictated by me on 02/22/2022 . I have reviewed the chart and agree that the record accurately reflects my personal performance of the history, physical exam, assessment and plan. I have also personally directed, reviewed, and agreed with the chart.

## 2022-02-22 NOTE — HISTORY OF PRESENT ILLNESS
[de-identified] : 61 year old M Presents for follow up evaluation S/p C3-C4, C4-C5, C5-C6 ACDF. DOS: 09-. He would like his implants examined and discuss recent bout of sepsis. He also wishes discuss his inability to walk secondary to back and leg pain with walking greater than one minute at a time. He needs a shopping cart when in the store so they can lean forward to relieve pain. He is aware of his severe lumbar stenosis, he admits to chronic right medial knee numbness. Pain with crossing legs, unable to tie shoes due to right groin pain, unable to get into car without lifting leg with his arms. \par \par Patient was admitted to [facility] with suspected sepsis, his implants were examined and the attending orthopedic believed all implants we appropriately placed.  [Ataxia] : no ataxia [Incontinence] : no incontinence [Loss of Dexterity] : good dexterity [Urinary Ret.] : no urinary retention

## 2022-05-04 ENCOUNTER — APPOINTMENT (OUTPATIENT)
Dept: ORTHOPEDIC SURGERY | Facility: CLINIC | Age: 62
End: 2022-05-04

## 2022-05-19 ENCOUNTER — APPOINTMENT (OUTPATIENT)
Dept: UROLOGY | Facility: CLINIC | Age: 62
End: 2022-05-19
Payer: MEDICAID

## 2022-05-19 PROCEDURE — ZZZZZ: CPT

## 2022-05-19 NOTE — ASSESSMENT
[FreeTextEntry1] : 60 yo male presents today with LUTS: most bothersome symptom urinary URGENCY.\par WHIT: right mid/base nodule felt\par Fam Hx: father kidney cancer\par Mother: breast cancer \par Feb 17 2022: S Cr =1\par US KB: Urinary bladder: Within normal limits. PVR = 11cc.\par The prostate gland measures 3.9 x 3.9 x 3.9 cm with a volume of 30 cc which is mildly enlarged.\par UCx / UA: neg  // PSA WNL //  mpMRI: negative: no lesions --  Volume: 29 cc.  \par FU May 2022: PHONE TELE APPOINTMENT: doing well, no urinary complaints. less frequency. never took the oxybutinin. is not on any uro med at the moment. he says he has a lot going on in his life and does not want to be taking any more meds miranda.\par \par Plan: Main issue: URGENCY + mild freq but all workup normal. he is pre-diabetic.\par Encourage blood glucose control to minimize frequency\par RTC 3 months for assessment of prostate and LUTS sx. Will then discuss if he needs to be on any med for frequency/urgency.\par \par All complaints were reviewed and explained thoroughly\par All the patient's questions were answered to the best of my ability.\par I appreciate the opportunity in taking care of Mr Aponte and will take excellent care of him/her.\par \par \par \par \par \par \par

## 2022-05-19 NOTE — LETTER BODY
[Dear  ___] : Dear  [unfilled], [Consult Letter:] : I had the pleasure of evaluating your patient, [unfilled]. [Please see my note below.] : Please see my note below. [Consult Closing:] : Thank you very much for allowing me to participate in the care of this patient.  If you have any questions, please do not hesitate to contact me. [Sincerely,] : Sincerely, [FreeTextEntry3] : Jaun Garcia MD\par Urologic Oncology\par [facility] Endoscopic urology\par [facility] [place] [facility] [place]\par [facility]\par

## 2022-05-19 NOTE — HISTORY OF PRESENT ILLNESS
[FreeTextEntry1] : 61 male patient with a pertinent past medical history of  who presents to clinic today with complaints of voiding LUTS.  Symptoms have been present for 1 year\par These symptoms are severely bothersome. URGENCY is the most bothersome symptom.  \par Wakes up 1x /night to use bathroom. Quality of his stream is okay // continuous. Bowel Movements are regular. Denies history of colonic diverticulum/diverticulitis.  \par Denies chronic opiate/anticholinergic use. Denies Hematuria. No  surgical Hx including Uroflow, UDS, Cystoscopy.  Denies past UTI Hx, denies fever, chills, sweats, flank pain. \par Denies history of kidney stones or bladder stones.  No Neurologic Hx, \par No Hx of DM , peripheral neuropathy. \par IPSS: 8     ///   STALIN: 2\par Fam Hx: father kidney cancer\par Mother: breast cancer\par \par Feb 17 2022:\par US KB: Urinary bladder: Within normal limits. PVR = 11cc. The prostate gland measures 3.9 x 3.9 x 3.9 cm with a volume of 30 cc which is mildly enlarged. UCx / UA: neg  // PSA WNL. mpMRI: negative: no lesions --  Volume: 29 cc.\par FU May 2022: PHONE TELE APPOINTMENT: doing well, no urinary complaints. less frequency. never took the oxybutinin. is not on any uro med at the moment. he says he has a lot going on in his life and does not want to be taking any more meds miranda. [Urinary Urgency] : urinary urgency 1-2 cups/cans per day

## 2022-05-24 ENCOUNTER — APPOINTMENT (OUTPATIENT)
Dept: ORTHOPEDIC SURGERY | Facility: CLINIC | Age: 62
End: 2022-05-24
Payer: MEDICAID

## 2022-05-24 VITALS
BODY MASS INDEX: 24.91 KG/M2 | DIASTOLIC BLOOD PRESSURE: 83 MMHG | HEIGHT: 66 IN | WEIGHT: 155 LBS | SYSTOLIC BLOOD PRESSURE: 123 MMHG | HEART RATE: 73 BPM

## 2022-05-24 DIAGNOSIS — M48.062 SPINAL STENOSIS, LUMBAR REGION WITH NEUROGENIC CLAUDICATION: ICD-10-CM

## 2022-05-24 DIAGNOSIS — G95.9 DISEASE OF SPINAL CORD, UNSPECIFIED: ICD-10-CM

## 2022-05-24 DIAGNOSIS — M54.12 DISEASE OF SPINAL CORD, UNSPECIFIED: ICD-10-CM

## 2022-05-24 DIAGNOSIS — M16.11 UNILATERAL PRIMARY OSTEOARTHRITIS, RIGHT HIP: ICD-10-CM

## 2022-05-24 PROCEDURE — 72040 X-RAY EXAM NECK SPINE 2-3 VW: CPT

## 2022-05-24 PROCEDURE — 99213 OFFICE O/P EST LOW 20 MIN: CPT

## 2022-05-24 NOTE — PHYSICAL EXAM
[Poor Appearance] : well-appearing [Acute Distress] : not in acute distress [Obese] : not obese [de-identified] : CONSTITUTIONAL: Patient is a very pleasant individual who is well-nourished and appears stated age. \par PSYCHIATRIC: Alert and oriented times three and in no apparent distress, and participates with orthopedic evaluation well.\par HEAD: Atraumatic and nonsyndromic in appearance.\par EENT: No thyromegaly, EOMI.\par RESPIRATORY: Respiratory rate is regular, not dyspneic on examination.\par LYMPHATICS: There is no cervical or axillary lymphadenopathy.\par INTEGUMENTARY: Skin is clean, dry, and intact about the bilateral upper extremities and cervical spine. \par VASCULAR: There is brisk capillary refill about the bilateral upper extremities and radial pulses are 2/4. \par NEUROLOGIC: Negative L'hirmitte, negative Spurling’s sign. There are no pathologic reflexes. Mild neurogenic claudication. Deep tendon reflexes are well-maintained at +2/4 of the bilateral upper extremities and are symmetric.\par MUSCULOSKELETAL: There is no visible muscular atrophy. Manual motor strength is well maintained in the bilateral upper extremities. Cervical range of motion is well maintained. The patient ambulates in a non-myelopathic manner. Normal secondary orthopaedic exam of bilateral shoulders, elbows and hands. Elbow flexion and extension, wrist extension, finger flexion and abduction are well maintained. + internal derangement characteristics of the right hip consistent with degenerative joint disease. Mild mechanically oriented lower back pain.  [de-identified] : AP and Lateral views of the cervical spine taken 05/24/2022 demonstrates 3 level ACDF with a broken screw distally.

## 2022-05-24 NOTE — DISCUSSION/SUMMARY
[de-identified] : We talked about the nature of the condition and treatment options. Anticipatory guidance regarding internal derangement characteristics of the hip was given. We discussed his broken screws, recommend routine follow up. The patient will follow up in 6 months for a repeat clinical assessment regarding his ACDF. \par \par Prior to appointment and during encounter with patient extensive medical records were reviewed including but not limited to, hospital records, out patient records, imaging results, and lab data. During this appointment the patient was examined, diagnoses were discussed and explained in a face to face manner. In addition extensive time was spent reviewing aforementioned diagnostic studies. Counseling including abnormal image results, differential diagnoses, treatment options, risk and benefits, lifestyle changes, current condition, and current medications was performed. Patient's comments, questions, and concerns were address and patient verbalized understanding. Based on this patient's presentation at our office, which is an orthopedic spine surgeon's office, this patient inherently / intrinsically has a risk, however minute, of developing  issues such as Cauda equina syndrome, bowel and bladder changes, or progression of motor or neurological deficits such as paralysis which may be permanent.

## 2022-05-24 NOTE — ADDENDUM
[FreeTextEntry1] : Documented by Miki Soto acting as a scribe for Dr. Melvin Buckley on 05/24/2022. All medical record entries made by the Scribe were at my, Dr. Melvin Buckley, direction and personally dictated by me on 05/24/2022 . I have reviewed the chart and agree that the record accurately reflects my personal performance of the history, physical exam, assessment and plan. I have also personally directed, reviewed, and agreed with the chart.
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
## 2022-08-31 NOTE — ASSESSMENT
[FreeTextEntry1] : 60 yo male presents today with LUTS: most bothersome symptom urinary URGENCY.\par WHIT: right mid/base nodule felt\par Fam Hx: father kidney cancer\par Mother: breast cancer \par Feb 17 2022: S Cr =1\par US KB: Urinary bladder: Within normal limits. PVR = 11cc.\par The prostate gland measures 3.9 x 3.9 x 3.9 cm with a volume of 30 cc which is mildly enlarged.\par UCx / UA: neg  // PSA WNL //  mpMRI: negative: no lesions --  Volume: 29 cc.  \par \par FU May 2022: PHONE TELE APPOINTMENT: doing well, no urinary complaints. less frequency. never took the oxybutinin. is not on any uro med at the moment. he says he has a lot going on in his life and does not want to be taking any more meds miranda.\par \par FU 9/1/22: \par \par \par Plan: Main issue: URGENCY + mild freq but all workup normal. he is pre-diabetic.\par Encourage blood glucose control to minimize frequency\par \par \par \par \par \par \par \par

## 2022-08-31 NOTE — LETTER BODY
[Dear  ___] : Dear  [unfilled], [Consult Letter:] : I had the pleasure of evaluating your patient, [unfilled]. [Please see my note below.] : Please see my note below. [Consult Closing:] : Thank you very much for allowing me to participate in the care of this patient.  If you have any questions, please do not hesitate to contact me. [Sincerely,] : Sincerely, [FreeTextEntry3] : Jaun Garcia MD\par Urologic Oncology\par Robotic [facility] Endoscopic urology\par [facility] [place]\par [facility]\par

## 2022-09-01 ENCOUNTER — APPOINTMENT (OUTPATIENT)
Dept: UROLOGY | Facility: CLINIC | Age: 62
End: 2022-09-01

## 2022-09-22 ENCOUNTER — OUTPATIENT (OUTPATIENT)
Dept: OUTPATIENT SERVICES | Facility: HOSPITAL | Age: 62
LOS: 1 days | End: 2022-09-22
Payer: MEDICAID

## 2022-09-22 ENCOUNTER — APPOINTMENT (OUTPATIENT)
Dept: RADIOLOGY | Facility: CLINIC | Age: 62
End: 2022-09-22

## 2022-09-22 DIAGNOSIS — R50.9 FEVER, UNSPECIFIED: ICD-10-CM

## 2022-09-22 DIAGNOSIS — Z00.8 ENCOUNTER FOR OTHER GENERAL EXAMINATION: ICD-10-CM

## 2022-09-22 DIAGNOSIS — Z98.890 OTHER SPECIFIED POSTPROCEDURAL STATES: Chronic | ICD-10-CM

## 2022-09-22 DIAGNOSIS — Z90.49 ACQUIRED ABSENCE OF OTHER SPECIFIED PARTS OF DIGESTIVE TRACT: Chronic | ICD-10-CM

## 2022-09-22 PROCEDURE — 71046 X-RAY EXAM CHEST 2 VIEWS: CPT | Mod: 26

## 2022-09-22 PROCEDURE — 71046 X-RAY EXAM CHEST 2 VIEWS: CPT

## 2022-10-18 ENCOUNTER — APPOINTMENT (OUTPATIENT)
Dept: ORTHOPEDIC SURGERY | Facility: CLINIC | Age: 62
End: 2022-10-18

## 2022-10-18 VITALS
DIASTOLIC BLOOD PRESSURE: 82 MMHG | BODY MASS INDEX: 24.91 KG/M2 | HEIGHT: 66 IN | HEART RATE: 70 BPM | SYSTOLIC BLOOD PRESSURE: 133 MMHG | WEIGHT: 155 LBS

## 2022-10-18 DIAGNOSIS — G56.21 LESION OF ULNAR NERVE, RIGHT UPPER LIMB: ICD-10-CM

## 2022-10-18 DIAGNOSIS — Z98.1 ARTHRODESIS STATUS: ICD-10-CM

## 2022-10-18 DIAGNOSIS — M60.9 MYOSITIS, UNSPECIFIED: ICD-10-CM

## 2022-10-18 PROCEDURE — 99214 OFFICE O/P EST MOD 30 MIN: CPT

## 2022-10-18 PROCEDURE — 72040 X-RAY EXAM NECK SPINE 2-3 VW: CPT
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
## 2022-10-18 NOTE — PHYSICAL EXAM
[de-identified] : CONSTITUTIONAL:  Patient is a very pleasant individual who is well-nourished and appears stated age. \par PSYCHIATRIC:  Alert and oriented times three and in no apparent distress, and participates with orthopedic evaluation well.\par HEAD:  Atraumatic and  nonsyndromic in appearance.\par EENT: No thyromegaly, EOMI.\par RESPIRATORY:  Respiratory rate is regular, not dyspneic on examination.\par LYMPHATICS:  There is no cervical or axillary lymphadenopathy.\par INTEGUMENTARY:  Skin is clean, dry, and intact about the bilateral upper extremities and cervical spine. \par VASCULAR:   There is brisk capillary refill about the bilateral upper extremities and radial pulses are 2/4. \par NEUROLOGIC:  Negative L'hirmitte, negative Spurling's sign. There are no pathologic reflexes. There is no decrease in sensation of the bilateral upper extremities on Wartenberg pinwheel examination.  Deep tendon reflexes are well-maintained at +2/4 of the bilateral upper extremities and are symmetric.\par MUSCULOSKELETAL:  There is visible muscular atrophy bilateral thenar eminences.  Manual motor strength is well maintained in the left upper remedy, right biceps 4 out of 5.  Cervical range of motion is well maintained with some pain on cervical extension.  The patient ambulates in a non-myelopathic manner. Normal secondary orthopaedic exam of bilateral shoulders, elbows and hands.  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 [de-identified] : X-rays reviewed of the cervical spine on today's date of October 18, 2022 shows well-maintained 3 level ACDF inferior screws are broken however this is unchanged when compared to May 2022
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
## 2023-03-27 NOTE — HISTORY OF PRESENT ILLNESS
On Call:  Patient     pravastatin (PRAVACHOL) 10 MG tablet  Passed protocol    Left message on patient home phone to schedule with new provider   [FreeTextEntry1] : URO HX: 61 male patient with a pertinent past medical history of  who presents to clinic today with complaints of voiding LUTS.  Symptoms have been present for 1 year\par These symptoms are severely bothersome. URGENCY is the most bothersome symptom.  \par Wakes up 1x /night to use bathroom. Quality of his stream is okay // continuous. Bowel Movements are regular. Denies history of colonic diverticulum/diverticulitis.  \par Denies chronic opiate/anticholinergic use. Denies Hematuria. No  surgical Hx including Uroflow, UDS, Cystoscopy.  Denies past UTI Hx, denies fever, chills, sweats, flank pain. \par Denies history of kidney stones or bladder stones.  No Neurologic Hx, \par No Hx of DM , peripheral neuropathy. \par IPSS: 8     ///   STALIN: 2\par Fam Hx: father kidney cancer\par Mother: breast cancer\par \par Feb 17 2022:\par US KB: Urinary bladder: Within normal limits. PVR = 11cc. The prostate gland measures 3.9 x 3.9 x 3.9 cm with a volume of 30 cc which is mildly enlarged. UCx / UA: neg  // PSA WNL. mpMRI: negative: no lesions --  Volume: 29 cc.\par FU May 2022: PHONE TELE APPOINTMENT: doing well, no urinary complaints. less frequency. never took the oxybutinin. is not on any uro med at the moment. he says he has a lot going on in his life and does not want to be taking any more meds miranda.\par \par FU 9/1/22: Reassess prostate and LUTS sx  [Urinary Urgency] : urinary urgency

## 2023-07-10 NOTE — REASON FOR VISIT
[Follow-Up Visit] : a follow-up visit for [Back Pain] : back pain same name as above [FreeTextEntry2] : MRI reading done @ [facility] 08/16/2019